# Patient Record
Sex: MALE | Race: WHITE | NOT HISPANIC OR LATINO | Employment: FULL TIME | URBAN - METROPOLITAN AREA
[De-identification: names, ages, dates, MRNs, and addresses within clinical notes are randomized per-mention and may not be internally consistent; named-entity substitution may affect disease eponyms.]

---

## 2023-03-31 LAB — COBALAMIN (VITAMIN B12) (PG/ML) IN SER/PLAS: 589 PG/ML (ref 211–911)

## 2023-08-25 LAB
ALANINE AMINOTRANSFERASE (SGPT) (U/L) IN SER/PLAS: 11 U/L (ref 10–52)
ALBUMIN (G/DL) IN SER/PLAS: 4 G/DL (ref 3.4–5)
ALKALINE PHOSPHATASE (U/L) IN SER/PLAS: 42 U/L (ref 33–136)
ANION GAP IN SER/PLAS: 12 MMOL/L (ref 10–20)
ASPARTATE AMINOTRANSFERASE (SGOT) (U/L) IN SER/PLAS: 14 U/L (ref 9–39)
BASOPHILS (10*3/UL) IN BLOOD BY AUTOMATED COUNT: 0.05 X10E9/L (ref 0–0.1)
BASOPHILS/100 LEUKOCYTES IN BLOOD BY AUTOMATED COUNT: 1 % (ref 0–2)
BILIRUBIN TOTAL (MG/DL) IN SER/PLAS: 1.6 MG/DL (ref 0–1.2)
CALCIUM (MG/DL) IN SER/PLAS: 8.6 MG/DL (ref 8.6–10.3)
CARBON DIOXIDE, TOTAL (MMOL/L) IN SER/PLAS: 26 MMOL/L (ref 21–32)
CHLORIDE (MMOL/L) IN SER/PLAS: 103 MMOL/L (ref 98–107)
CHOLESTEROL (MG/DL) IN SER/PLAS: 159 MG/DL (ref 0–199)
CHOLESTEROL IN HDL (MG/DL) IN SER/PLAS: 52.9 MG/DL
CHOLESTEROL/HDL RATIO: 3
CREATININE (MG/DL) IN SER/PLAS: 0.9 MG/DL (ref 0.5–1.3)
EOSINOPHILS (10*3/UL) IN BLOOD BY AUTOMATED COUNT: 0.15 X10E9/L (ref 0–0.7)
EOSINOPHILS/100 LEUKOCYTES IN BLOOD BY AUTOMATED COUNT: 3.1 % (ref 0–6)
ERYTHROCYTE DISTRIBUTION WIDTH (RATIO) BY AUTOMATED COUNT: 12 % (ref 11.5–14.5)
ERYTHROCYTE MEAN CORPUSCULAR HEMOGLOBIN CONCENTRATION (G/DL) BY AUTOMATED: 33.6 G/DL (ref 32–36)
ERYTHROCYTE MEAN CORPUSCULAR VOLUME (FL) BY AUTOMATED COUNT: 91 FL (ref 80–100)
ERYTHROCYTES (10*6/UL) IN BLOOD BY AUTOMATED COUNT: 4.31 X10E12/L (ref 4.5–5.9)
GFR MALE: >90 ML/MIN/1.73M2
GLUCOSE (MG/DL) IN SER/PLAS: 72 MG/DL (ref 74–99)
HEMATOCRIT (%) IN BLOOD BY AUTOMATED COUNT: 39.3 % (ref 41–52)
HEMOGLOBIN (G/DL) IN BLOOD: 13.2 G/DL (ref 13.5–17.5)
IMMATURE GRANULOCYTES/100 LEUKOCYTES IN BLOOD BY AUTOMATED COUNT: 0.2 % (ref 0–0.9)
LDL: 97 MG/DL (ref 0–99)
LEUKOCYTES (10*3/UL) IN BLOOD BY AUTOMATED COUNT: 4.8 X10E9/L (ref 4.4–11.3)
LYMPHOCYTES (10*3/UL) IN BLOOD BY AUTOMATED COUNT: 1.58 X10E9/L (ref 1.2–4.8)
LYMPHOCYTES/100 LEUKOCYTES IN BLOOD BY AUTOMATED COUNT: 33 % (ref 13–44)
MONOCYTES (10*3/UL) IN BLOOD BY AUTOMATED COUNT: 0.34 X10E9/L (ref 0.1–1)
MONOCYTES/100 LEUKOCYTES IN BLOOD BY AUTOMATED COUNT: 7.1 % (ref 2–10)
NEUTROPHILS (10*3/UL) IN BLOOD BY AUTOMATED COUNT: 2.66 X10E9/L (ref 1.2–7.7)
NEUTROPHILS/100 LEUKOCYTES IN BLOOD BY AUTOMATED COUNT: 55.6 % (ref 40–80)
PLATELETS (10*3/UL) IN BLOOD AUTOMATED COUNT: 172 X10E9/L (ref 150–450)
POTASSIUM (MMOL/L) IN SER/PLAS: 3.7 MMOL/L (ref 3.5–5.3)
PROTEIN TOTAL: 6.4 G/DL (ref 6.4–8.2)
SODIUM (MMOL/L) IN SER/PLAS: 137 MMOL/L (ref 136–145)
THYROTROPIN (MIU/L) IN SER/PLAS BY DETECTION LIMIT <= 0.05 MIU/L: 2.25 MIU/L (ref 0.44–3.98)
TRIGLYCERIDE (MG/DL) IN SER/PLAS: 48 MG/DL (ref 0–149)
UREA NITROGEN (MG/DL) IN SER/PLAS: 13 MG/DL (ref 6–23)
VLDL: 10 MG/DL (ref 0–40)

## 2023-08-26 LAB
ABO GROUP (TYPE) IN BLOOD: NORMAL
PROSTATE SPECIFIC ANTIGEN,SCREEN: 0.99 NG/ML (ref 0–4)
RH FACTOR: NORMAL

## 2023-09-06 ENCOUNTER — TELEPHONE (OUTPATIENT)
Dept: PRIMARY CARE | Facility: CLINIC | Age: 63
End: 2023-09-06
Payer: COMMERCIAL

## 2023-09-08 LAB
BASOPHILS (10*3/UL) IN BLOOD BY AUTOMATED COUNT: 0.04 X10E9/L (ref 0–0.1)
BASOPHILS/100 LEUKOCYTES IN BLOOD BY AUTOMATED COUNT: 0.8 % (ref 0–2)
BILIRUBIN TOTAL (MG/DL) IN SER/PLAS: 0.9 MG/DL (ref 0–1.2)
EOSINOPHILS (10*3/UL) IN BLOOD BY AUTOMATED COUNT: 0.23 X10E9/L (ref 0–0.7)
EOSINOPHILS/100 LEUKOCYTES IN BLOOD BY AUTOMATED COUNT: 4.9 % (ref 0–6)
ERYTHROCYTE DISTRIBUTION WIDTH (RATIO) BY AUTOMATED COUNT: 12.5 % (ref 11.5–14.5)
ERYTHROCYTE MEAN CORPUSCULAR HEMOGLOBIN CONCENTRATION (G/DL) BY AUTOMATED: 33.3 G/DL (ref 32–36)
ERYTHROCYTE MEAN CORPUSCULAR VOLUME (FL) BY AUTOMATED COUNT: 92 FL (ref 80–100)
ERYTHROCYTES (10*6/UL) IN BLOOD BY AUTOMATED COUNT: 4.24 X10E12/L (ref 4.5–5.9)
HEMATOCRIT (%) IN BLOOD BY AUTOMATED COUNT: 39 % (ref 41–52)
HEMOGLOBIN (G/DL) IN BLOOD: 13 G/DL (ref 13.5–17.5)
IMMATURE GRANULOCYTES/100 LEUKOCYTES IN BLOOD BY AUTOMATED COUNT: 0.2 % (ref 0–0.9)
LEUKOCYTES (10*3/UL) IN BLOOD BY AUTOMATED COUNT: 4.7 X10E9/L (ref 4.4–11.3)
LYMPHOCYTES (10*3/UL) IN BLOOD BY AUTOMATED COUNT: 1.72 X10E9/L (ref 1.2–4.8)
LYMPHOCYTES/100 LEUKOCYTES IN BLOOD BY AUTOMATED COUNT: 36.4 % (ref 13–44)
MONOCYTES (10*3/UL) IN BLOOD BY AUTOMATED COUNT: 0.3 X10E9/L (ref 0.1–1)
MONOCYTES/100 LEUKOCYTES IN BLOOD BY AUTOMATED COUNT: 6.3 % (ref 2–10)
NEUTROPHILS (10*3/UL) IN BLOOD BY AUTOMATED COUNT: 2.43 X10E9/L (ref 1.2–7.7)
NEUTROPHILS/100 LEUKOCYTES IN BLOOD BY AUTOMATED COUNT: 51.4 % (ref 40–80)
PLATELETS (10*3/UL) IN BLOOD AUTOMATED COUNT: 181 X10E9/L (ref 150–450)

## 2023-09-12 NOTE — RESULT ENCOUNTER NOTE
Results discussed with the patient.  Bilirubin is normal now.  Has anemia.  He does have some night sweats.  No bleeding.  Up-to-date with colonoscopy.  Discussed stopping all vitamins temporarily to see if night sweats improve.  Follow-up in a couple of weeks and will decide on further work-up regarding anemia.  Continue to eat iron rich foods

## 2023-09-19 ENCOUNTER — OFFICE VISIT (OUTPATIENT)
Dept: PRIMARY CARE | Facility: CLINIC | Age: 63
End: 2023-09-19
Payer: COMMERCIAL

## 2023-09-19 VITALS
WEIGHT: 170.9 LBS | TEMPERATURE: 97.9 F | SYSTOLIC BLOOD PRESSURE: 131 MMHG | OXYGEN SATURATION: 97 % | BODY MASS INDEX: 23.18 KG/M2 | DIASTOLIC BLOOD PRESSURE: 75 MMHG | HEART RATE: 52 BPM

## 2023-09-19 DIAGNOSIS — D64.9 ANEMIA, UNSPECIFIED TYPE: Primary | ICD-10-CM

## 2023-09-19 PROCEDURE — 1036F TOBACCO NON-USER: CPT | Performed by: INTERNAL MEDICINE

## 2023-09-19 PROCEDURE — 99212 OFFICE O/P EST SF 10 MIN: CPT | Performed by: INTERNAL MEDICINE

## 2023-09-19 RX ORDER — MELATONIN 10 MG
10 CAPSULE ORAL NIGHTLY
COMMUNITY
Start: 2023-07-11

## 2023-09-19 RX ORDER — METOPROLOL TARTRATE 25 MG/1
25 TABLET, FILM COATED ORAL 2 TIMES DAILY
COMMUNITY
Start: 2023-03-13 | End: 2023-12-06 | Stop reason: ALTCHOICE

## 2023-09-19 RX ORDER — PERPHENAZINE 16 MG
TABLET ORAL
COMMUNITY
Start: 2022-06-15

## 2023-09-19 RX ORDER — NITROGLYCERIN 0.4 MG/1
TABLET SUBLINGUAL
COMMUNITY
End: 2023-12-06 | Stop reason: SDUPTHER

## 2023-09-19 RX ORDER — GABAPENTIN 100 MG/1
100 CAPSULE ORAL
COMMUNITY
Start: 2022-06-15 | End: 2023-10-27 | Stop reason: ALTCHOICE

## 2023-09-19 RX ORDER — ACETAMINOPHEN 500 MG
50 TABLET ORAL
COMMUNITY

## 2023-09-19 ASSESSMENT — PATIENT HEALTH QUESTIONNAIRE - PHQ9
2. FEELING DOWN, DEPRESSED OR HOPELESS: NOT AT ALL
SUM OF ALL RESPONSES TO PHQ9 QUESTIONS 1 AND 2: 0
1. LITTLE INTEREST OR PLEASURE IN DOING THINGS: NOT AT ALL

## 2023-09-19 NOTE — PROGRESS NOTES
Subjective   Patient ID: Abhijit Hickman is a 62 y.o. male who presents for Follow-up.    HPI   Patient is here to discuss recent labs.  Labs showed anemia.  He has not had any obvious blood loss.  He had a colonoscopy couple of years ago.  He does have hemorrhoids.  Recently did not notice any bleeding  Eating healthy  No abdominal pain or bowel changes.  Seeing surgeon and scheduled for his hernia surgery next month  Has not lost further weight  No cough or chest pain    Review of Systems  No bruising  No night sweats  No headache or dizziness  No chest pain shortness of breath or palpitation  Objective   /75   Pulse 52   Temp 36.6 °C (97.9 °F)   Wt 77.5 kg (170 lb 14.4 oz)   SpO2 97%   BMI 23.18 kg/m²     Physical Exam  Constitutional:       Appearance: Normal appearance.   HENT:      Head: Normocephalic.   Eyes:      Conjunctiva/sclera: Conjunctivae normal.   Cardiovascular:      Rate and Rhythm: Normal rate and regular rhythm.   Pulmonary:      Effort: Pulmonary effort is normal.      Breath sounds: Normal breath sounds.   Abdominal:      General: Abdomen is flat. Bowel sounds are normal. There is no distension.      Palpations: Abdomen is soft. There is no mass.      Tenderness: There is no abdominal tenderness. There is no guarding or rebound.   Neurological:      Mental Status: He is alert.         Assessment/Plan   Problem List Items Addressed This Visit       Anemia - Primary     Anemia is not significant.  Could be from hemorrhoid and intermittent bleeding.  He will be getting labs prior to surgery in 2 weeks.  We will also check ferritin and iron level.  Increase iron rich foods.  We will do further work-up if anemia worsens         Relevant Orders    Ferritin    Iron and TIBC

## 2023-09-20 PROBLEM — K40.90 INGUINAL HERNIA: Status: ACTIVE | Noted: 2023-09-20

## 2023-09-20 PROBLEM — R20.0 NUMBNESS IN RIGHT LEG: Status: ACTIVE | Noted: 2023-09-20

## 2023-09-20 PROBLEM — R79.89 ABNORMAL CBC: Status: ACTIVE | Noted: 2023-09-20

## 2023-09-20 PROBLEM — M79.2 NEUROPATHIC PAIN: Status: ACTIVE | Noted: 2023-09-20

## 2023-09-20 PROBLEM — M20.41 HAMMER TOE OF RIGHT FOOT: Status: ACTIVE | Noted: 2023-09-20

## 2023-09-20 PROBLEM — D48.5 NEOPLASM OF UNCERTAIN BEHAVIOR OF SKIN: Status: ACTIVE | Noted: 2021-07-06

## 2023-09-20 PROBLEM — K92.2 GASTROINTESTINAL HEMORRHAGE: Status: ACTIVE | Noted: 2023-09-20

## 2023-09-20 PROBLEM — M25.512 LEFT SHOULDER PAIN: Status: ACTIVE | Noted: 2023-09-20

## 2023-09-20 PROBLEM — M54.2 NECK PAIN ON LEFT SIDE: Status: ACTIVE | Noted: 2023-09-20

## 2023-09-20 PROBLEM — M54.12 CERVICAL RADICULOPATHY: Status: ACTIVE | Noted: 2023-09-20

## 2023-09-20 PROBLEM — C85.10 B-CELL LYMPHOMA (MULTI): Status: ACTIVE | Noted: 2023-09-20

## 2023-09-20 PROBLEM — H91.90 HEARING LOSS: Status: ACTIVE | Noted: 2023-09-20

## 2023-09-20 PROBLEM — R20.0 NUMBNESS AND TINGLING IN LEFT HAND: Status: ACTIVE | Noted: 2023-09-20

## 2023-09-20 PROBLEM — F32.2 MAJOR DEPRESSIVE DISORDER, SINGLE EPISODE, SEVERE WITHOUT PSYCHOTIC FEATURES (MULTI): Status: ACTIVE | Noted: 2023-09-20

## 2023-09-20 PROBLEM — F41.1 GENERALIZED ANXIETY DISORDER: Status: ACTIVE | Noted: 2023-09-20

## 2023-09-20 PROBLEM — C85.90 LYMPHOMA (MULTI): Status: ACTIVE | Noted: 2023-09-20

## 2023-09-20 PROBLEM — I73.00 RAYNAUD PHENOMENON: Status: ACTIVE | Noted: 2023-09-20

## 2023-09-20 PROBLEM — R20.2 NUMBNESS AND TINGLING IN LEFT HAND: Status: ACTIVE | Noted: 2023-09-20

## 2023-09-20 PROBLEM — I48.0 PAROXYSMAL A-FIB (MULTI): Status: ACTIVE | Noted: 2023-09-20

## 2023-09-20 PROBLEM — G54.0 THORACIC OUTLET SYNDROME: Status: ACTIVE | Noted: 2023-09-20

## 2023-09-20 PROBLEM — G56.12 LEFT MEDIAN NERVE NEUROPATHY: Status: ACTIVE | Noted: 2023-09-20

## 2023-09-20 RX ORDER — GABAPENTIN 400 MG/1
300 CAPSULE ORAL 3 TIMES DAILY
COMMUNITY
End: 2023-12-06 | Stop reason: SDUPTHER

## 2023-09-20 RX ORDER — ACETAMINOPHEN 160 MG/5ML
200 SUSPENSION, ORAL (FINAL DOSE FORM) ORAL 3 TIMES DAILY
COMMUNITY

## 2023-09-20 RX ORDER — TRAZODONE HYDROCHLORIDE 50 MG/1
50 TABLET ORAL NIGHTLY
COMMUNITY
End: 2023-10-27 | Stop reason: ALTCHOICE

## 2023-09-20 RX ORDER — AMOXICILLIN AND CLAVULANATE POTASSIUM 500; 125 MG/1; MG/1
500 TABLET, FILM COATED ORAL 2 TIMES DAILY
COMMUNITY
End: 2023-10-27 | Stop reason: ALTCHOICE

## 2023-09-20 RX ORDER — TRAZODONE HYDROCHLORIDE 100 MG/1
100 TABLET ORAL NIGHTLY
COMMUNITY
End: 2023-12-06 | Stop reason: ALTCHOICE

## 2023-09-20 NOTE — ASSESSMENT & PLAN NOTE
Anemia is not significant.  Could be from hemorrhoid and intermittent bleeding.  He will be getting labs prior to surgery in 2 weeks.  We will also check ferritin and iron level.  Increase iron rich foods.  We will do further work-up if anemia worsens

## 2023-09-27 LAB
ANION GAP IN SER/PLAS: 10 MMOL/L (ref 10–20)
BASOPHILS (10*3/UL) IN BLOOD BY AUTOMATED COUNT: 0.04 X10E9/L (ref 0–0.1)
BASOPHILS/100 LEUKOCYTES IN BLOOD BY AUTOMATED COUNT: 0.7 % (ref 0–2)
CALCIUM (MG/DL) IN SER/PLAS: 9.2 MG/DL (ref 8.6–10.3)
CARBON DIOXIDE, TOTAL (MMOL/L) IN SER/PLAS: 29 MMOL/L (ref 21–32)
CHLORIDE (MMOL/L) IN SER/PLAS: 104 MMOL/L (ref 98–107)
CREATININE (MG/DL) IN SER/PLAS: 0.9 MG/DL (ref 0.5–1.3)
EOSINOPHILS (10*3/UL) IN BLOOD BY AUTOMATED COUNT: 0.25 X10E9/L (ref 0–0.7)
EOSINOPHILS/100 LEUKOCYTES IN BLOOD BY AUTOMATED COUNT: 4.6 % (ref 0–6)
ERYTHROCYTE DISTRIBUTION WIDTH (RATIO) BY AUTOMATED COUNT: 12.6 % (ref 11.5–14.5)
ERYTHROCYTE MEAN CORPUSCULAR HEMOGLOBIN CONCENTRATION (G/DL) BY AUTOMATED: 33.6 G/DL (ref 32–36)
ERYTHROCYTE MEAN CORPUSCULAR VOLUME (FL) BY AUTOMATED COUNT: 90 FL (ref 80–100)
ERYTHROCYTES (10*6/UL) IN BLOOD BY AUTOMATED COUNT: 4.49 X10E12/L (ref 4.5–5.9)
GFR MALE: >90 ML/MIN/1.73M2
GLUCOSE (MG/DL) IN SER/PLAS: 84 MG/DL (ref 74–99)
HEMATOCRIT (%) IN BLOOD BY AUTOMATED COUNT: 40.5 % (ref 41–52)
HEMOGLOBIN (G/DL) IN BLOOD: 13.6 G/DL (ref 13.5–17.5)
IMMATURE GRANULOCYTES/100 LEUKOCYTES IN BLOOD BY AUTOMATED COUNT: 0.4 % (ref 0–0.9)
LEUKOCYTES (10*3/UL) IN BLOOD BY AUTOMATED COUNT: 5.5 X10E9/L (ref 4.4–11.3)
LYMPHOCYTES (10*3/UL) IN BLOOD BY AUTOMATED COUNT: 1.78 X10E9/L (ref 1.2–4.8)
LYMPHOCYTES/100 LEUKOCYTES IN BLOOD BY AUTOMATED COUNT: 32.5 % (ref 13–44)
MONOCYTES (10*3/UL) IN BLOOD BY AUTOMATED COUNT: 0.37 X10E9/L (ref 0.1–1)
MONOCYTES/100 LEUKOCYTES IN BLOOD BY AUTOMATED COUNT: 6.8 % (ref 2–10)
NEUTROPHILS (10*3/UL) IN BLOOD BY AUTOMATED COUNT: 3.02 X10E9/L (ref 1.2–7.7)
NEUTROPHILS/100 LEUKOCYTES IN BLOOD BY AUTOMATED COUNT: 55 % (ref 40–80)
PLATELETS (10*3/UL) IN BLOOD AUTOMATED COUNT: 188 X10E9/L (ref 150–450)
POTASSIUM (MMOL/L) IN SER/PLAS: 4.1 MMOL/L (ref 3.5–5.3)
SODIUM (MMOL/L) IN SER/PLAS: 139 MMOL/L (ref 136–145)
UREA NITROGEN (MG/DL) IN SER/PLAS: 12 MG/DL (ref 6–23)

## 2023-09-29 LAB — STAPH/MRSA SCREEN, CULTURE: NORMAL

## 2023-09-30 ENCOUNTER — LAB (OUTPATIENT)
Dept: LAB | Facility: LAB | Age: 63
End: 2023-09-30
Payer: COMMERCIAL

## 2023-09-30 DIAGNOSIS — D64.9 ANEMIA, UNSPECIFIED TYPE: ICD-10-CM

## 2023-09-30 LAB
FERRITIN SERPL-MCNC: 194 NG/ML (ref 20–300)
IRON SATN MFR SERPL: 33 % (ref 25–45)
IRON SERPL-MCNC: 102 UG/DL (ref 35–150)
TIBC SERPL-MCNC: 313 UG/DL (ref 240–445)
UIBC SERPL-MCNC: 211 UG/DL (ref 110–370)

## 2023-09-30 PROCEDURE — 36415 COLL VENOUS BLD VENIPUNCTURE: CPT

## 2023-09-30 NOTE — H&P (VIEW-ONLY)
History of Present Illness:   Admission Reason: bilateral inguinal hernia without  obstruction or gangrene   HPI:    Date of Consult: 9/27/23  Referring Provider:  Dr. Webb   Surgery, Date and Length: robotic vs laparoscopic bilateral inguinal hernia repair; 10/13/23; 90 minutes   Abhijit Hickman is a 62 year-old male who presents [facility] for preoperative risk assessment prior to surgery.  He noticed increased size of a bulge in his right and left groin respectively. Bothers him when he does his physical exercise routine. He  does ride long distances on his bike.  This note was created in part upon personal review of patient?s medical records.   Patient is scheduled to have robotic vs laparoscopic bilateral inguinal hernia repair.  Medical History  Paroxysmal A. fib - dx 3/2023 s/p cardioversion - metoprolol  dc?d and finished xarelto - follows with Dr. Gay LV 4/10/23 - f/u in 6-12 months   b-cell lymphoma - s/p radiation 2020 - remission  anemia   Raynaud?s  Thoracic outlet syndrome   depression       STOP BANG  2                                                                                                                                                     CAPRINI  6  Surgical History  Cardioversion 3/31/23  Appendectomy  Oral surgery       Pt denies any past history of anesthetic complications such as PONV, awareness, prolonged sedation, dental damage, aspiration, cardiac arrest, difficult intubation, difficult I.V. access or  unexpected hospital admissions.  No malignant hyperthermia or pseudocholinesterase deficiency.  No history blood transfusions  Pt is not a Jehovah Witness and will accept blood and blood products if medically indicated.  Type and screen not sent.    Body Measurements:  Height:  183.5 cm   Weight:   9/27 15:27: Weight in kg (Weight (kg))  77.2  9/27 15:27: Weight in lbs ((lbs))  170.1  9/27 15:27: BMI (kg/m2) (BMI (kg/m2))  22.926        Family History:   Cancer: yes  father -  multiple myeloma   Diabetes: yes  mother and father     Social History:   Social History   Smoking Status never smoker   Alcohol Use denies   Drug Use denies   Social History                  Allergies:  ·  No Known Allergies :     Medications Prior to Admission:   7 DAYS BEFORE SURGERY, ON __10/6______, STOP these medications:  Co-Q10 200 mg oral capsule: 1 cap(s) orally once a day  Alpha Lipoic Acid 600 mg oral capsule: 1 cap(s) orally once a day  collagen peptides: 1  orally once a day  L- Carnitine tartrate: orally once a day  Creatine Monohydrate: orally once a day      HOLD MORNING OF SURGERY :  cholecalciferol 25 mcg (1000 intl units) oral capsule: 1 cap(s) orally once a day (Vitamin D3)  Vitamin B Complex oral tablet: 1 tab(s) orally once a day  magnesium malate 125 mg oral tablet, chewable: 2 tab(s) orally once a day  potassium chloride 10 mEq oral capsule, extended release: 1 cap(s) orally 2 times a day      DAY OF SURGERY, TAKE THESE MEDICATIONS:   gabapentin 100 mg oral capsule: 4 cap(s) orally 3 times a day  nitroglycerin 0.4 mg sublingual tablet: 1 tab(s) sublingually every 5 minutes, As Needed - for chest pain  traZODone 100 mg oral tablet: 1  orally once a day (at bedtime).    Review of Systems:   Eyes: POSITIVE: Vision Loss/ Change;  COMMENTS: glasses     Cardiac: COMMENTS: METS 4  rides bicycle     All Other Systems: All other systems reviewed and  are negative     Objective:     Objective Information:        T   P  R  BP   MAP  SpO2   Value  35.9  54  18  146/85      100%  Date/Time 9/27 15:27 9/27 15:27 9/27 15:27 9/27 15:27    9/27 15:27  Range  (35.9C - 35.9C )  (54 - 54 )  (18 - 18 )  (146 - 146 )/ (85 - 85 )    (100% - 100% )         Weights   9/27 15:27: Weight in kg (Weight (kg))  77.2  9/27 15:27: Weight in lbs ((lbs))  170.1  9/27 15:27: BMI (kg/m2) (BMI (kg/m2))  22.926    Physical Exam by System     Constitutional: Alert orientated x 3 no acute distress  pleasant and  cooperative   Eyes: PERRL   ENMT: pharynx clear no erythema or exudate noted   Head/Neck: normocephalic  neck supple nontender trachea midline  no palpable lymphadenopathy noted   no carotid bruits noted   Respiratory/Thorax: CTA  without wheezes rales rhonchi  heard  respiratory rate regular and unlabored   Cardiovascular: RR without murmur heard at this time   Gastrointestinal: soft nontender no definitive masses  noted   Genitourinary: defer   Musculoskeletal: no gross deformities  moves all extremities without difficulty   Extremities: no pedal edema noted  DP pulses palpable   Neurological: cranial nerves grossly intact   Breast: defer   Lymphatic: No significant lymphadenopathy   Psychological: Appropriate mood and behavior   Skin: warm and dry, good color, good turgor and texture     Airway   ·  Mouth Opening OK yes   ·  Neck Flexibility OK yes   ·  Loose Teeth no   ·  Snoring Hx/Sleep Apnea no     Airway Classification   ·  Airway Image Comments no dentures / partials     Recent Lab Results     Results:        I have reviewed these laboratory results:    Basic Metabolic Panel  27-Sep-2023 16:23:00      Result Value    Glucose, Serum  84    NA  139    K  4.1    CL  104    Bicarbonate, Serum  29    Anion Gap, Serum  10    BUN  12    CREAT  0.90    GFR Male  >90    Calcium, Serum  9.2      Complete Blood Count + Differential  27-Sep-2023 16:23:00      Result Value    White Blood Cell Count  5.5    Red Blood Cell Count  4.49   L   HGB  13.6    HCT  40.5   L   MCV  90    MCHC  33.6    PLT  188    RDW-CV  12.6    Neutrophil %  55.0    Immature Granulocytes %  0.4    Lymphocyte %  32.5    Monocyte %  6.8    Eosinophil %  4.6    Basophil %  0.7    Neutrophil Count  3.02    Lymphocyte Count  1.78    Monocyte Count  0.37    Eosinophil Count  0.25    Basophil Count  0.04        Radiology Results     Results:    EKG 9/27/23  unusual P axis, possible ectopic atrial bradycardia  IRBBB  inferior infarct, age  undetermined  cannot r/o anteroseptal infarct, age undetermined  54 BPM    HAYLEE 3/13/23  CONCLUSIONS:   1. Left ventricular systolic function is normal with a 55% estimated ejection fraction.   2. No left atrial thrombus.   3. There is no evidence of a patent foramen ovale.       Assessment and Plan:   Assessment:    Patient is a 62 yr-old male scheduled for robotic vs laparoscopic bilateral inguinal hernia repair.  Patient has no active cardiac symptoms. Patient denies any chest pain, tightness, heaviness, pressure, radiating pain, palpitations,  irregular heartbeats, lightheadedness, cough, congestion, shortness of breath, BRADY, PND, near syncope, weight loss or gain.                                                                                                                                                                                  RCRI 1,   6% Risk of MACE  Hematology       Patient instructed to ambulate as soon as possible postoperatively to decrease thromboembolic risk.       Initiate mechanical DVT prophylaxis as soon as possible and initiate chemical prophylaxis when deemed safe from a bleeding standpoint post surgery.       Caprini  6       VTE prophylaxis per surgical team     Tests ordered in PAT: cbc, bmp, mrsa, ekg   LABS REVIEWED from 9/29/23 - unremarkable   Follow up: MRSA pending; see addendum   Risk assessment complete.  Patient is scheduled for a low/intermediate surgical risk procedure.   Preoperative medication instructions were provided and reviewed with the patient.  Any additional testing or evaluation was explained to the patient.  Nothing by mouth instructions were discussed  and patient?s questions were answered prior to conclusion of this encounter.  Patient verbalized understanding of preoperative instructions given in preadmission testing.  Discharge instructions available in EMR.        Electronic Signatures for Addendum Section:   Marlene Sandoval (LPN) (Signed Addendum  29-Sep-2023 12:24)   PATIENT: ARTHUR BAEZ                MRN: [id number]    LOCATION: 41550     BILL#:   [id number]                        : [birth year]  AGE:    SEX: M     ORDER#:  [id number]                        ORDERED BY:   ROSCOE CASTRO  SOURCE:  ANTERIOR NARES                    COLLECTED:  23 16:23  ANTIBIOTICS AT GARETH.:                      RECEIVED :  23 00:22  SITE:                                         R E S U L T S     STAPH/MRSA SCREEN                            FINAL     23 09:55        NO Staphylococcus aureus ISOLATED.     Electronic Signatures:  Fátima Owens (PAC)  (Signed 29-Sep-2023 07:30)   Authored: History of Present Illness, Family History,  Social History, Allergies, Medications Prior to Admission, Review of Systems, Objective, Assessment and Plan, Note Completion      Last Updated: 29-Sep-2023 12:24 by Marlene Sandoval (LPN)

## 2023-10-02 ENCOUNTER — TELEPHONE (OUTPATIENT)
Dept: PRIMARY CARE | Facility: CLINIC | Age: 63
End: 2023-10-02
Payer: COMMERCIAL

## 2023-10-12 ENCOUNTER — ANESTHESIA EVENT (OUTPATIENT)
Dept: OPERATING ROOM | Facility: HOSPITAL | Age: 63
End: 2023-10-12
Payer: COMMERCIAL

## 2023-10-13 ENCOUNTER — HOSPITAL ENCOUNTER (OUTPATIENT)
Facility: HOSPITAL | Age: 63
Setting detail: OUTPATIENT SURGERY
Discharge: HOME | End: 2023-10-13
Attending: SURGERY | Admitting: SURGERY
Payer: COMMERCIAL

## 2023-10-13 ENCOUNTER — ANESTHESIA (OUTPATIENT)
Dept: OPERATING ROOM | Facility: HOSPITAL | Age: 63
End: 2023-10-13
Payer: COMMERCIAL

## 2023-10-13 VITALS
BODY MASS INDEX: 23.44 KG/M2 | WEIGHT: 173.06 LBS | TEMPERATURE: 97.2 F | SYSTOLIC BLOOD PRESSURE: 148 MMHG | DIASTOLIC BLOOD PRESSURE: 79 MMHG | HEIGHT: 72 IN | OXYGEN SATURATION: 99 % | HEART RATE: 62 BPM | RESPIRATION RATE: 15 BRPM

## 2023-10-13 DIAGNOSIS — K40.20 NON-RECURRENT BILATERAL INGUINAL HERNIA WITHOUT OBSTRUCTION OR GANGRENE: Primary | ICD-10-CM

## 2023-10-13 PROCEDURE — A4217 STERILE WATER/SALINE, 500 ML: HCPCS | Performed by: SURGERY

## 2023-10-13 PROCEDURE — 3700000001 HC GENERAL ANESTHESIA TIME - INITIAL BASE CHARGE: Performed by: SURGERY

## 2023-10-13 PROCEDURE — 3600000009 HC OR TIME - EACH INCREMENTAL 1 MINUTE - PROCEDURE LEVEL FOUR: Performed by: SURGERY

## 2023-10-13 PROCEDURE — 7100000010 HC PHASE TWO TIME - EACH INCREMENTAL 1 MINUTE: Performed by: SURGERY

## 2023-10-13 PROCEDURE — A49650 PR LAP,INGUINAL HERNIA REPR,INITIAL: Performed by: NURSE ANESTHETIST, CERTIFIED REGISTERED

## 2023-10-13 PROCEDURE — C1781 MESH (IMPLANTABLE): HCPCS | Performed by: SURGERY

## 2023-10-13 PROCEDURE — 3600000004 HC OR TIME - INITIAL BASE CHARGE - PROCEDURE LEVEL FOUR: Performed by: SURGERY

## 2023-10-13 PROCEDURE — A49650 PR LAP,INGUINAL HERNIA REPR,INITIAL: Performed by: ANESTHESIOLOGY

## 2023-10-13 PROCEDURE — 2500000005 HC RX 250 GENERAL PHARMACY W/O HCPCS: Performed by: SURGERY

## 2023-10-13 PROCEDURE — 2500000005 HC RX 250 GENERAL PHARMACY W/O HCPCS: Performed by: NURSE ANESTHETIST, CERTIFIED REGISTERED

## 2023-10-13 PROCEDURE — 3700000002 HC GENERAL ANESTHESIA TIME - EACH INCREMENTAL 1 MINUTE: Performed by: SURGERY

## 2023-10-13 PROCEDURE — 2580000001 HC RX 258 IV SOLUTIONS: Performed by: ANESTHESIOLOGY

## 2023-10-13 PROCEDURE — 49650 LAP ING HERNIA REPAIR INIT: CPT | Performed by: SURGERY

## 2023-10-13 PROCEDURE — 7100000001 HC RECOVERY ROOM TIME - INITIAL BASE CHARGE: Performed by: SURGERY

## 2023-10-13 PROCEDURE — 2580000001 HC RX 258 IV SOLUTIONS: Performed by: NURSE ANESTHETIST, CERTIFIED REGISTERED

## 2023-10-13 PROCEDURE — 7100000002 HC RECOVERY ROOM TIME - EACH INCREMENTAL 1 MINUTE: Performed by: SURGERY

## 2023-10-13 PROCEDURE — 2500000004 HC RX 250 GENERAL PHARMACY W/ HCPCS (ALT 636 FOR OP/ED): Performed by: NURSE ANESTHETIST, CERTIFIED REGISTERED

## 2023-10-13 PROCEDURE — 2780000003 HC OR 278 NO HCPCS: Performed by: SURGERY

## 2023-10-13 PROCEDURE — 7100000009 HC PHASE TWO TIME - INITIAL BASE CHARGE: Performed by: SURGERY

## 2023-10-13 PROCEDURE — 2500000004 HC RX 250 GENERAL PHARMACY W/ HCPCS (ALT 636 FOR OP/ED): Performed by: SURGERY

## 2023-10-13 PROCEDURE — 2720000007 HC OR 272 NO HCPCS: Performed by: SURGERY

## 2023-10-13 DEVICE — MESH, 3DMAX MID, 5 X 7 IN, X-LARGE RIGHT: Type: IMPLANTABLE DEVICE | Site: GROIN | Status: FUNCTIONAL

## 2023-10-13 DEVICE — MESH, 3DMAX MID, 5 X 7 IN, X-LARGE LEFT: Type: IMPLANTABLE DEVICE | Site: GROIN | Status: FUNCTIONAL

## 2023-10-13 RX ORDER — DEXAMETHASONE SODIUM PHOSPHATE 4 MG/ML
INJECTION, SOLUTION INTRA-ARTICULAR; INTRALESIONAL; INTRAMUSCULAR; INTRAVENOUS; SOFT TISSUE AS NEEDED
Status: DISCONTINUED | OUTPATIENT
Start: 2023-10-13 | End: 2023-10-13

## 2023-10-13 RX ORDER — HYDROMORPHONE HYDROCHLORIDE 1 MG/ML
INJECTION, SOLUTION INTRAMUSCULAR; INTRAVENOUS; SUBCUTANEOUS AS NEEDED
Status: DISCONTINUED | OUTPATIENT
Start: 2023-10-13 | End: 2023-10-13

## 2023-10-13 RX ORDER — CEFAZOLIN SODIUM 2 G/100ML
INJECTION, SOLUTION INTRAVENOUS AS NEEDED
Status: DISCONTINUED | OUTPATIENT
Start: 2023-10-13 | End: 2023-10-13

## 2023-10-13 RX ORDER — LIDOCAINE HYDROCHLORIDE 20 MG/ML
INJECTION, SOLUTION INFILTRATION; PERINEURAL AS NEEDED
Status: DISCONTINUED | OUTPATIENT
Start: 2023-10-13 | End: 2023-10-13

## 2023-10-13 RX ORDER — SODIUM CHLORIDE, SODIUM LACTATE, POTASSIUM CHLORIDE, CALCIUM CHLORIDE 600; 310; 30; 20 MG/100ML; MG/100ML; MG/100ML; MG/100ML
INJECTION, SOLUTION INTRAVENOUS CONTINUOUS PRN
Status: DISCONTINUED | OUTPATIENT
Start: 2023-10-13 | End: 2023-10-13

## 2023-10-13 RX ORDER — SODIUM CHLORIDE 0.9 G/100ML
IRRIGANT IRRIGATION AS NEEDED
Status: DISCONTINUED | OUTPATIENT
Start: 2023-10-13 | End: 2023-10-13 | Stop reason: HOSPADM

## 2023-10-13 RX ORDER — BUPIVACAINE HYDROCHLORIDE 5 MG/ML
INJECTION, SOLUTION PERINEURAL AS NEEDED
Status: DISCONTINUED | OUTPATIENT
Start: 2023-10-13 | End: 2023-10-13 | Stop reason: HOSPADM

## 2023-10-13 RX ORDER — SODIUM CHLORIDE, SODIUM LACTATE, POTASSIUM CHLORIDE, CALCIUM CHLORIDE 600; 310; 30; 20 MG/100ML; MG/100ML; MG/100ML; MG/100ML
100 INJECTION, SOLUTION INTRAVENOUS CONTINUOUS
Status: DISCONTINUED | OUTPATIENT
Start: 2023-10-13 | End: 2023-10-13 | Stop reason: HOSPADM

## 2023-10-13 RX ORDER — FENTANYL CITRATE 50 UG/ML
INJECTION, SOLUTION INTRAMUSCULAR; INTRAVENOUS AS NEEDED
Status: DISCONTINUED | OUTPATIENT
Start: 2023-10-13 | End: 2023-10-13

## 2023-10-13 RX ORDER — PROPOFOL 10 MG/ML
INJECTION, EMULSION INTRAVENOUS AS NEEDED
Status: DISCONTINUED | OUTPATIENT
Start: 2023-10-13 | End: 2023-10-13

## 2023-10-13 RX ORDER — IBUPROFEN 600 MG/1
600 TABLET ORAL EVERY 6 HOURS PRN
Qty: 20 TABLET | Refills: 0 | Status: SHIPPED | OUTPATIENT
Start: 2023-10-13

## 2023-10-13 RX ORDER — MEPERIDINE HYDROCHLORIDE 25 MG/ML
12.5 INJECTION INTRAMUSCULAR; INTRAVENOUS; SUBCUTANEOUS EVERY 10 MIN PRN
Status: DISCONTINUED | OUTPATIENT
Start: 2023-10-13 | End: 2023-10-13 | Stop reason: HOSPADM

## 2023-10-13 RX ORDER — ONDANSETRON HYDROCHLORIDE 2 MG/ML
4 INJECTION, SOLUTION INTRAVENOUS ONCE AS NEEDED
Status: DISCONTINUED | OUTPATIENT
Start: 2023-10-13 | End: 2023-10-13 | Stop reason: HOSPADM

## 2023-10-13 RX ORDER — ROCURONIUM BROMIDE 10 MG/ML
INJECTION, SOLUTION INTRAVENOUS AS NEEDED
Status: DISCONTINUED | OUTPATIENT
Start: 2023-10-13 | End: 2023-10-13

## 2023-10-13 RX ORDER — OXYCODONE HYDROCHLORIDE 5 MG/1
5 TABLET ORAL EVERY 4 HOURS PRN
Status: DISCONTINUED | OUTPATIENT
Start: 2023-10-13 | End: 2023-10-13 | Stop reason: HOSPADM

## 2023-10-13 RX ORDER — ONDANSETRON HYDROCHLORIDE 2 MG/ML
INJECTION, SOLUTION INTRAVENOUS AS NEEDED
Status: DISCONTINUED | OUTPATIENT
Start: 2023-10-13 | End: 2023-10-13

## 2023-10-13 RX ORDER — OXYCODONE HYDROCHLORIDE 5 MG/1
5 TABLET ORAL EVERY 6 HOURS PRN
Qty: 12 TABLET | Refills: 0 | Status: SHIPPED | OUTPATIENT
Start: 2023-10-13 | End: 2023-10-27 | Stop reason: ALTCHOICE

## 2023-10-13 RX ORDER — POLYETHYLENE GLYCOL 3350 17 G/17G
17 POWDER, FOR SOLUTION ORAL DAILY PRN
Qty: 10 PACKET | Refills: 0 | Status: SHIPPED | OUTPATIENT
Start: 2023-10-13 | End: 2023-12-06 | Stop reason: ALTCHOICE

## 2023-10-13 RX ADMIN — SUGAMMADEX 160 MG: 100 INJECTION, SOLUTION INTRAVENOUS at 11:51

## 2023-10-13 RX ADMIN — DEXAMETHASONE SODIUM PHOSPHATE 8 MG: 4 INJECTION, SOLUTION INTRAMUSCULAR; INTRAVENOUS at 10:18

## 2023-10-13 RX ADMIN — FENTANYL CITRATE 100 MCG: 50 INJECTION, SOLUTION INTRAMUSCULAR; INTRAVENOUS at 10:02

## 2023-10-13 RX ADMIN — EPHEDRINE SULFATE 10 MG: 50 INJECTION, SOLUTION INTRAVENOUS at 11:54

## 2023-10-13 RX ADMIN — EPHEDRINE SULFATE 10 MG: 50 INJECTION, SOLUTION INTRAVENOUS at 11:08

## 2023-10-13 RX ADMIN — SODIUM CHLORIDE, POTASSIUM CHLORIDE, SODIUM LACTATE AND CALCIUM CHLORIDE: 600; 310; 30; 20 INJECTION, SOLUTION INTRAVENOUS at 10:02

## 2023-10-13 RX ADMIN — EPHEDRINE SULFATE 10 MG: 50 INJECTION, SOLUTION INTRAVENOUS at 10:26

## 2023-10-13 RX ADMIN — PROPOFOL 160 MG: 10 INJECTION, EMULSION INTRAVENOUS at 10:12

## 2023-10-13 RX ADMIN — SODIUM CHLORIDE, POTASSIUM CHLORIDE, SODIUM LACTATE AND CALCIUM CHLORIDE: 600; 310; 30; 20 INJECTION, SOLUTION INTRAVENOUS at 11:25

## 2023-10-13 RX ADMIN — FENTANYL CITRATE 50 MCG: 50 INJECTION, SOLUTION INTRAMUSCULAR; INTRAVENOUS at 11:23

## 2023-10-13 RX ADMIN — LIDOCAINE HYDROCHLORIDE 80 MG: 20 INJECTION, SOLUTION INFILTRATION; PERINEURAL at 10:12

## 2023-10-13 RX ADMIN — FENTANYL CITRATE 50 MCG: 50 INJECTION, SOLUTION INTRAMUSCULAR; INTRAVENOUS at 11:54

## 2023-10-13 RX ADMIN — HYDROMORPHONE HYDROCHLORIDE 0.5 MG: 1 INJECTION, SOLUTION INTRAMUSCULAR; INTRAVENOUS; SUBCUTANEOUS at 12:01

## 2023-10-13 RX ADMIN — GLYCOPYRROLATE 0.2 MG: 0.2 INJECTION, SOLUTION INTRAMUSCULAR; INTRAVITREAL at 10:22

## 2023-10-13 RX ADMIN — CEFAZOLIN SODIUM 2 G: 2 INJECTION, SOLUTION INTRAVENOUS at 10:17

## 2023-10-13 RX ADMIN — ONDANSETRON 4 MG: 2 INJECTION INTRAMUSCULAR; INTRAVENOUS at 10:02

## 2023-10-13 RX ADMIN — ROCURONIUM BROMIDE 50 MG: 10 INJECTION INTRAVENOUS at 10:12

## 2023-10-13 RX ADMIN — SODIUM CHLORIDE, POTASSIUM CHLORIDE, SODIUM LACTATE AND CALCIUM CHLORIDE 100 ML/HR: 600; 310; 30; 20 INJECTION, SOLUTION INTRAVENOUS at 13:09

## 2023-10-13 RX ADMIN — PROPOFOL 40 MG: 10 INJECTION, EMULSION INTRAVENOUS at 10:32

## 2023-10-13 RX ADMIN — ROCURONIUM BROMIDE 20 MG: 10 INJECTION INTRAVENOUS at 11:04

## 2023-10-13 RX ADMIN — HYDROMORPHONE HYDROCHLORIDE 0.5 MG: 1 INJECTION, SOLUTION INTRAMUSCULAR; INTRAVENOUS; SUBCUTANEOUS at 11:56

## 2023-10-13 ASSESSMENT — COLUMBIA-SUICIDE SEVERITY RATING SCALE - C-SSRS
2. HAVE YOU ACTUALLY HAD ANY THOUGHTS OF KILLING YOURSELF?: NO
1. IN THE PAST MONTH, HAVE YOU WISHED YOU WERE DEAD OR WISHED YOU COULD GO TO SLEEP AND NOT WAKE UP?: NO
6. HAVE YOU EVER DONE ANYTHING, STARTED TO DO ANYTHING, OR PREPARED TO DO ANYTHING TO END YOUR LIFE?: NO

## 2023-10-13 ASSESSMENT — PAIN SCALES - GENERAL
PAINLEVEL_OUTOF10: 0 - NO PAIN
PAINLEVEL_OUTOF10: 0 - NO PAIN
PAIN_LEVEL: 3
PAINLEVEL_OUTOF10: 0 - NO PAIN
PAINLEVEL_OUTOF10: 0 - NO PAIN
PAINLEVEL_OUTOF10: 2
PAINLEVEL_OUTOF10: 0 - NO PAIN

## 2023-10-13 ASSESSMENT — PAIN - FUNCTIONAL ASSESSMENT: PAIN_FUNCTIONAL_ASSESSMENT: 0-10

## 2023-10-13 NOTE — OP NOTE
Robotic Bilateral Inguinal Hernia Repair with Mesh Placement (B) Operative Note     Date: 10/13/2023  OR Location: [facility] OR    Name: Abhijit Hickman, : 1960, Age: 62 y.o., MRN: [id number], Sex: male    Diagnosis  Pre-op Diagnosis     * Bilateral inguinal hernia, without obstruction or gangrene, not specified as recurrent [K40.20] Post-op Diagnosis     * Bilateral inguinal hernia, without obstruction or gangrene, not specified as recurrent [K40.20]     Procedures    * Robotic Bilateral Inguinal Hernia Repair with Mesh Placement    Surgeons      * Sebastián Webb - Primary    Resident/Fellow/Other Assistant:  PGY 5 Ebertz    Procedure Summary  Anesthesia: General  ASA: III  Anesthesia Staff: Anesthesiologist: Abhijit Buckner MD; Abhijit Calvin MD  CRNA: NADINE Asencio-CRNA  C-AA: LOBITO Mar  Estimated Blood Loss: 5mL  Intra-op Medications:   Medication Name Total Dose   BUPivacaine HCl (Marcaine) 0.5 % (5 mg/mL) injection 10 mL   sodium chloride 0.9 % irrigation solution 1,000 mL              Anesthesia Record               Intraprocedure I/O Totals          Intake    lactated Ringer's infusion 1000.00 mL    Total Intake 1000 mL          Specimen: No specimens collected     Staff:   Circulator: Georgette Velez RN; Violeta An RN  Relief Scrub: Yumiko Cyr  Scrub Person: Bernarda Lambert         Drains and/or Catheters:   Urethral Catheter Non-latex 16 Fr. (Active)       Tourniquet Times:         Implants:  Implants       Type Name Action Serial No.      Surgical Mesh Sling Implant MESH, 3DMAX MID, 5 X 7 IN, X-LARGE RIGHT - SNA - [id number] Implanted NA     Surgical Mesh Sling Implant MESH, 3DMAX MID, 5 X 7 IN, X-LARGE LEFT - SNA - [id number] Implanted NA              Findings: bilateral direct    Indications: Abhijit Hickman is an 62 y.o. male who is having surgery for Bilateral inguinal hernia, without obstruction or gangrene, not specified as recurrent [K40.20].     The  patient was seen in the preoperative area. The risks, benefits, complications, treatment options, non-operative alternatives, expected recovery and outcomes were discussed with the patient. The possibilities of reaction to medication, pulmonary aspiration, injury to surrounding structures, bleeding, recurrent infection, the need for additional procedures, failure to diagnose a condition, and creating a complication requiring transfusion or operation were discussed with the patient. The patient concurred with the proposed plan, giving informed consent.  The site of surgery was properly noted/marked if necessary per policy. The patient has been actively warmed in preoperative area. Preoperative antibiotics have been ordered and given within 1 hours of incision. Venous thrombosis prophylaxis have been ordered including bilateral sequential compression devices    Procedure Details: Abhijit comes in for elective bilateral inguinal hernia repair.  Risks and benefits had been detailed in the office and informed consent was obtained.  He was brought to the OR placed supine.  Timeout was performed to confirm patient procedure.  Antibiotics were given general anesthesia ministered through the tracheal tube.  Sanchez catheter was placed.    We then prepped and draped sterilely.  Injected local made supraumbilical incision with a scalpel.  Fascia was incised and entered peritoneal cavity the balloon port.  We insufflated and introduced the 30 degree robotic camera.  I placed 8 mm robotic ports in the right upper quadrant and left upper quadrant respectively.  We then docked the robot after introducing all her mesh needles.  Patient was noted to have bilateral direct hernias.  Starting on the right side we opened up her peritoneal flap starting laterally and worked our way lateral to medial across the top of the myopectineal orifice through the medial umbilical ligament.  Medially we exposed symphysis pubis and Larry's ligament.   Laterally we exposed transversus abdominis and psoas muscle.  Small cord lipoma was reduced.  Preperitoneal fat reduced from the direct defect.  The leading edge of the peritoneal sac was then gently peeled down away from cord structures.  This gave us our critical view.  Extra-large 3D max patch was arrayed open the myopectineal orifice and then it was secured inferomedially and cephalad to the abdominal wall with interrupted 3-0 Vicryl sutures.  The peritoneal flap was then reapproximated with a running 3 0 V-Loc absorbable suture.  Similar dissection was done on the left side.  This time we started medial and worked our way medial to lateral across the top of the myopectineal orifice with scissors and cautery.  We connected our medial dissection and then laterally exposed transversus abdominis and psoas muscle.  Cord lipoma was reduced.  Leading edge of the peritoneal flap was gently peeled down away from cord structures.  Preperitoneal fat was reduced from the direct defect.  Again the extra-large 3D max patch was introduced and secured inferomedially to Larry's ligament cephalad to the abdominal wall with 3-0 Vicryl sutures.  Again we closed the peritoneal flap with a running 3 0 V-Loc.  All the needles were accounted for and removed.  We then undocked the robot removed our ports and closed the umbilical fascia with 0 Vicryl.  Skin incisions were closed with 4-0 Monocryl and Dermabond.  Patient was ultimately transferred recovery room extubated in satisfactory condition    Complications:  None; patient tolerated the procedure well.    Disposition: PACU - hemodynamically stable.  Condition: stable         Additional Details: home    Attending Attestation: I was present and scrubbed for the entire procedure.    Sebastián Webb  Phone Number: [phone]

## 2023-10-13 NOTE — ANESTHESIA PROCEDURE NOTES
Airway  Date/Time: 10/13/2023 10:13 AM  Urgency: elective    Airway not difficult    Staffing  Performed: CRNA   Authorized by: Abhijit Calvin MD    Performed by: NADINE Asencio-KAYCEE  Patient location during procedure: OR    Indications and Patient Condition  Indications for airway management: anesthesia and airway protection  Spontaneous ventilation: present  Sedation level: deep  Preoxygenated: yes  Patient position: sniffing  Mask difficulty assessment: 1 - vent by mask  No planned trial extubation    Final Airway Details  Final airway type: endotracheal airway      Successful airway: ETT  Cuffed: yes   Successful intubation technique: direct laryngoscopy  Facilitating devices/methods: intubating stylet  Endotracheal tube insertion site: oral  Blade: Peyman  Blade size: #4  ETT size (mm): 8.0  Cormack-Lehane Classification: grade I - full view of glottis  Placement verified by: chest auscultation and capnometry   Cuff volume (mL): 7  Measured from: gums  ETT to gums (cm): 24  Number of attempts at approach: 1

## 2023-10-13 NOTE — BRIEF OP NOTE
Date: 10/13/2023  OR Location: [facility] OR    Name: Abhijit Hickman, : 1960, Age: 62 y.o., MRN: [id number], Sex: male    Diagnosis  Pre-op Diagnosis     * Bilateral inguinal hernia, without obstruction or gangrene, not specified as recurrent [K40.20] Post-op Diagnosis     * Bilateral inguinal hernia, without obstruction or gangrene, not specified as recurrent [K40.20]     Procedures    * Robotic Bilateral Inguinal Hernia Repair with Mesh Placement    Surgeons      * Sebastián Webb - Primary    Resident/Fellow/Other Assistant:  Abhijit Olivarez    Procedure Summary  Anesthesia: General  ASA: III  Anesthesia Staff: Anesthesiologist: Abhijit Buckner MD; Abhijit Calvin MD  CRNA: NADINE Asencio-CRNA  C-AA: LOBITO Mar  Estimated Blood Loss: 6mL  Intra-op Medications:   Medication Name Total Dose   BUPivacaine HCl (Marcaine) 0.5 % (5 mg/mL) injection 19 mL   sodium chloride 0.9 % irrigation solution 1,000 mL              Anesthesia Record               Intraprocedure I/O Totals          Intake    lactated Ringer's infusion 1000.00 mL    Total Intake 1000 mL          Specimen: No specimens collected     Staff:   Circulator: Georgette Velez RN; Violeta An RN  Relief Scrub: Yumiko Cyr  Scrub Person: Bernarda Lambert          Findings: Bilateral direct inguinal hernias     Complications:  None; patient tolerated the procedure well.     Disposition: PACU - hemodynamically stable.  Condition: stable  Specimens Collected: No specimens collected  Attending Attestation:     Sebastián Webb  Phone Number: [phone]

## 2023-10-13 NOTE — ANESTHESIA POSTPROCEDURE EVALUATION
Patient: Abhijit Hickman    Procedure Summary       Date: 10/13/23 Room / Location: [facility] 08 / [facility]    Anesthesia Start: 1002 Anesthesia Stop: 1220    Procedure: Robotic Bilateral Inguinal Hernia Repair with Mesh Placement (Bilateral: Abdomen) Diagnosis:       Bilateral inguinal hernia, without obstruction or gangrene, not specified as recurrent      (Bilateral inguinal hernia, without obstruction or gangrene, not specified as recurrent [K40.20])    Surgeons: Sebastián Webb MD Responsible Provider: SOPHIE Asencio    Anesthesia Type: general ASA Status: 3            Anesthesia Type: general    Vitals Value Taken Time   BP  10/13/23 1222   Temp  10/13/23 1222   Pulse  10/13/23 1222   Resp  10/13/23 1222   SpO2  10/13/23 1222       Anesthesia Post Evaluation    Patient location during evaluation: PACU  Patient participation: complete - patient participated  Level of consciousness: awake  Pain score: 3  Pain management: adequate  Multimodal analgesia pain management approach  Airway patency: patent  Two or more strategies used to mitigate risk of obstructive sleep apnea  Cardiovascular status: acceptable  Respiratory status: acceptable  Hydration status: acceptable        No notable events documented.

## 2023-10-13 NOTE — ANESTHESIA PREPROCEDURE EVALUATION
Patient: Abhijit Hickman    Procedure Information       Anesthesia Start Date/Time: 10/13/23 1002    Procedure: Robotic Bilateral Inguinal Hernia Repair vs Laparoscopic; Mesh Placement (Bilateral)    Location: [facility] OR 08 / [facility] OR    Surgeons: Sebastián Webb MD            Relevant Problems   Cardiovascular   (+) Paroxysmal A-fib (CMS/HCC)      GI   (+) Gastrointestinal hemorrhage      Neuro/Psych   (+) Cervical radiculopathy   (+) Generalized anxiety disorder   (+) Left median nerve neuropathy   (+) Major depressive disorder, single episode, severe without psychotic features (CMS/HCC)      Hematology   (+) Anemia   (+) B-cell lymphoma (CMS/HCC)   (+) Lymphoma (CMS/HCC)      Eyes, Ears, Nose, and Throat   (+) Hearing loss       Clinical information reviewed:   Tobacco  Allergies  Meds   Med Hx  Surg Hx   Fam Hx  Soc Hx        NPO/Void Status  Carbonhydrate Drink Given Prior to Surgery? : N  Date of Last Liquid: 10/13/23  Time of Last Liquid: 0500  Date of Last Solid: 10/12/23  Time of Last Solid: 1700  Last Intake Type: Clear fluids (sip of water)  Time of Last Void: 0700           Past Medical History:   Diagnosis Date   • Anemia    • Depression    • History of B-cell lymphoma    • Other abnormalities of gait and mobility 07/12/2018    Gait, antalgic   • Other conditions influencing health status     No significant past medical history   • Other enthesopathies, not elsewhere classified 07/12/2018    Capsulitis of foot, right   • Paroxysmal A-fib (CMS/HCC)    • Personal history of other diseases of the digestive system 08/23/2021    History of rectal bleeding   • Personal history of other specified conditions 09/27/2021    History of abnormal weight loss   • Raynaud disease    • Thoracic outlet syndrome       Past Surgical History:   Procedure Laterality Date   • APPENDECTOMY  05/22/2018    Appendectomy   • CARDIOVERSION     • OTHER SURGICAL HISTORY  05/17/2022    Oral surgery     Social History      Tobacco Use   • Smoking status: Never     Passive exposure: Never   • Smokeless tobacco: Never   Substance Use Topics   • Alcohol use: Never   • Drug use: Never      Current Outpatient Medications   Medication Instructions   • alpha lipoic acid 600 mg capsule oral   • amoxicillin-pot clavulanate (Augmentin) 500-125 mg tablet 500 mg, oral, 2 times daily, Until gone   • cholecalciferol (VITAMIN D3) 50 mcg, oral   • coenzyme Q-10 (CO Q-10) 200 mg, oral, 3 times daily   • gabapentin (NEURONTIN) 100 mg, oral   • gabapentin (NEURONTIN) 400 mg, oral, 3 times daily   • ibuprofen 600 mg, oral, Every 6 hours PRN   • melatonin 10 mg, oral, Nightly   • metoprolol tartrate (LOPRESSOR) 25 mg, oral, 2 times daily   • nitroglycerin (Nitrostat) 0.4 mg SL tablet DISSOLVE 1 TABLET UNDER THE TONGUE AS NEEDED FOR CHEST PAIN.   • oxyCODONE (ROXICODONE) 5 mg, oral, Every 6 hours PRN   • polyethylene glycol (GLYCOLAX, MIRALAX) 17 g, oral, Daily PRN   • traZODone (DESYREL) 100 mg, oral, Nightly   • traZODone (DESYREL) 50 mg, oral, Nightly, If trazodone 100mg is ineffective. May repeat once as needed for a total of 200mg at bedtime   • vitamin B complex vit C no.4 (SUPER B COMPLEX + C ORAL) 1 capsule, oral, 2 times daily      No Known Allergies     Chemistry    Lab Results   Component Value Date/Time     09/27/2023 1623    K 4.1 09/27/2023 1623     09/27/2023 1623    CO2 29 09/27/2023 1623    BUN 12 09/27/2023 1623    CREATININE 0.90 09/27/2023 1623    Lab Results   Component Value Date/Time    CALCIUM 9.2 09/27/2023 1623    ALKPHOS 42 08/25/2023 1648    AST 14 08/25/2023 1648    ALT 11 08/25/2023 1648    BILITOT 0.9 09/08/2023 1319          Lab Results   Component Value Date/Time    WBC 5.5 09/27/2023 1623    HGB 13.6 09/27/2023 1623    HCT 40.5 (L) 09/27/2023 1623     09/27/2023 1623     Lab Results   Component Value Date/Time    PROTIME 10.8 03/11/2023 1857    INR 0.9 03/11/2023 1857     No results found for this or  any previous visit (from the past 4464 hour(s)).  No results found for this or any previous visit from the past 1095 days.   HAYLEE 3/23::  CONCLUSIONS:  1. Left ventricular systolic function is normal with a 55% estimated ejection fraction.  2. No left atrial thrombus.  3. There is no evidence of a patent foramen ovale.    Visit Vitals  /80   Pulse 56   Temp 36.2 °C (97.2 °F) (Temporal)   Resp 15   Ht 1.829 m (6')   Wt 78.5 kg (173 lb 1 oz)   SpO2 100%   BMI 23.47 kg/m²   Smoking Status Never   BSA 2 m²       Anesthesia Evaluation     Physical Exam     Anesthesia Plan    ASA 3     general     intravenous induction   Postoperative administration of opioids is intended.  Anesthetic plan and risks discussed with patient.    Plan discussed with CRNA.

## 2023-10-27 ENCOUNTER — OFFICE VISIT (OUTPATIENT)
Dept: CARDIOLOGY | Facility: CLINIC | Age: 63
End: 2023-10-27
Payer: COMMERCIAL

## 2023-10-27 VITALS
RESPIRATION RATE: 16 BRPM | SYSTOLIC BLOOD PRESSURE: 138 MMHG | HEART RATE: 78 BPM | DIASTOLIC BLOOD PRESSURE: 82 MMHG | OXYGEN SATURATION: 98 % | BODY MASS INDEX: 22.65 KG/M2 | WEIGHT: 167 LBS

## 2023-10-27 DIAGNOSIS — I48.0 PAROXYSMAL A-FIB (MULTI): Primary | ICD-10-CM

## 2023-10-27 DIAGNOSIS — I45.10 INCOMPLETE RIGHT BUNDLE BRANCH BLOCK (RBBB): ICD-10-CM

## 2023-10-27 PROCEDURE — 99213 OFFICE O/P EST LOW 20 MIN: CPT | Performed by: STUDENT IN AN ORGANIZED HEALTH CARE EDUCATION/TRAINING PROGRAM

## 2023-10-27 PROCEDURE — 1036F TOBACCO NON-USER: CPT | Performed by: STUDENT IN AN ORGANIZED HEALTH CARE EDUCATION/TRAINING PROGRAM

## 2023-10-27 RX ORDER — CHLORHEXIDINE GLUCONATE ORAL RINSE 1.2 MG/ML
SOLUTION DENTAL
COMMUNITY
Start: 2023-09-27 | End: 2023-10-27 | Stop reason: ALTCHOICE

## 2023-10-27 NOTE — PROGRESS NOTES
[facility] Outpatient Follow-up Visit     Reason for Visit: follow-up for paroxysmal afib.     HPI: Abhijit Hickman is a 62 y.o.  male who presents today for a follow-up for paroxysmal atrial fibrillation. Past medical history of remote paroxysmal afib (Dx March 2023; s/p HAYLEE guided DCCV 3/13/2023), B-cell lymphoma s/p radiation, cervical radiculopathy, and hx of thoracic outlet syndrome.      Previously admitted for syncope, found to have afib with RVR. Patient noted sudden fatigue, dyspnea, and tachycardia the day prior to admission. Per his HR monitor, his HR was in 120s when his typical resting HR was in ~60 bpm. Patient remains very active with skiing and biking. He went to the bathroom and felt like he was going ot pass out so he laid down on floor. He ended up losing consciousness and later woke up on the floor. He denied any trauma, chest pain.      In the ED, ECG showed atrial fibrillation Chest XR showed no evidence of acute cardiopulmonary disease Placed on heparin ggt and initiated on metoprolol for rate control. Cardiology consulted for new diagnosis of afib.      Abhijit was evaluated at bedside by cardiology consult service on 3/12/2023. He remained in afib. He notes feeling generalized fatigue, mild dyspnea when in afib. No chest pains. No prior cardiac history. Previously tolerating vigorous activity without significant symptoms. As patient remained in afib > recommended HAYLEE guided DCCV > s/p successful HAYLEE guided DCCV on 3/13/2023; d/c on rivaroxaban 20 mg daily (for minimum of 4 weeks), metoprolol.      Patient presented to cardiology clinic on 4/7/2023. Abhijit feels at his baseline state of health, tolerating vigorous activity without significant symptoms. No further episodes of symptomatic afib per patient. No bleeding issues on rivaroxaban. Patient [id number] score is 0.     Patient presented to cardiology clinic on 10/27/2023. Recent hernia surgery; tolerated well. Heart  rate well controlled; no recent episodes of atrial fibrillation. Tolerating physical activity without active cardiac complaints. No recent syncope.      PMHx: As above  PSx: Appendectomy, oral surgery  FMHx: Mother T2DM, father T2DM and multiple myeloma  Social Hx: Never smoker, denies EtOH, denies illicit drug     ROS: 10 point ROS reviewed and otherwise negative except what is stated in HPI.     Prior CV testing:      HAYLEE (3/13/2023)- no LA or ALTA thrombus; s/p successful DCCV from afib with RVR to sinus rhythm       Review of Systems:  Review of Systems   Constitutional: Negative.   HENT: Negative.     Eyes: Negative.    Cardiovascular: Negative.    Respiratory: Negative.     Endocrine: Negative.    Hematologic/Lymphatic: Negative.    Skin: Negative.    Musculoskeletal: Negative.    Gastrointestinal: Negative.    Genitourinary: Negative.    Neurological: Negative.    Psychiatric/Behavioral: Negative.         Outpatient Medications:    Current Outpatient Medications:     alpha lipoic acid 600 mg capsule, Take by mouth., Disp: , Rfl:     cholecalciferol (Vitamin D3) 50 mcg (2,000 unit) capsule, Take 1 capsule (50 mcg) by mouth., Disp: , Rfl:     coenzyme Q-10 (Co Q-10) 200 mg capsule, Take 1 capsule (200 mg) by mouth 3 times a day., Disp: , Rfl:     gabapentin (Neurontin) 400 mg capsule, Take 1 capsule (400 mg) by mouth 3 times a day., Disp: , Rfl:     ibuprofen 600 mg tablet, Take 1 tablet (600 mg) by mouth every 6 hours if needed for moderate pain (4 - 6) for up to 20 doses., Disp: 20 tablet, Rfl: 0    melatonin 10 mg capsule, Take 1 capsule (10 mg) by mouth once daily at bedtime., Disp: , Rfl:     metoprolol tartrate (Lopressor) 25 mg tablet, Take 1 tablet (25 mg) by mouth 2 times a day., Disp: , Rfl:     nitroglycerin (Nitrostat) 0.4 mg SL tablet, DISSOLVE 1 TABLET UNDER THE TONGUE AS NEEDED FOR CHEST PAIN., Disp: , Rfl:     polyethylene glycol (Glycolax, Miralax) 17 gram packet, Take 17 g by mouth once daily  as needed (for constipation) for up to 10 doses., Disp: 10 packet, Rfl: 0    traZODone (Desyrel) 100 mg tablet, Take 1 tablet (100 mg) by mouth once daily at bedtime., Disp: , Rfl:     vitamin B complex vit C no.4 (SUPER B COMPLEX + C ORAL), Take 1 capsule by mouth 2 times a day., Disp: , Rfl:      Last Recorded Vitals  /82 (BP Location: Right arm, Patient Position: Sitting)   Pulse 78   Resp 16   Wt 75.8 kg (167 lb)   SpO2 98%   BMI 22.65 kg/m²     Physical Exam:    Physical Exam  Constitutional:       General: He is not in acute distress.  HENT:      Head: Normocephalic.      Mouth/Throat:      Mouth: Mucous membranes are moist.   Eyes:      Extraocular Movements: Extraocular movements intact.      Conjunctiva/sclera: Conjunctivae normal.   Cardiovascular:      Rate and Rhythm: Normal rate and regular rhythm.      Heart sounds: No murmur heard.  Pulmonary:      Effort: Pulmonary effort is normal. No respiratory distress.      Breath sounds: Normal breath sounds.   Abdominal:      General: There is no distension.      Palpations: Abdomen is soft.   Musculoskeletal:      Right lower leg: No edema.      Left lower leg: No edema.   Skin:     General: Skin is warm and dry.   Neurological:      General: No focal deficit present.      Mental Status: He is alert.      Cranial Nerves: No cranial nerve deficit.      Motor: No weakness.   Psychiatric:         Mood and Affect: Mood normal.         Behavior: Behavior normal.         Lab/Radiology/Diagnostic Review:    Labs    Lab Results   Component Value Date    GLUCOSE 84 09/27/2023    CALCIUM 9.2 09/27/2023     09/27/2023    K 4.1 09/27/2023    CO2 29 09/27/2023     09/27/2023    BUN 12 09/27/2023    CREATININE 0.90 09/27/2023       Lab Results   Component Value Date    WBC 5.5 09/27/2023    HGB 13.6 09/27/2023    HCT 40.5 (L) 09/27/2023    MCV 90 09/27/2023     09/27/2023       Lab Results   Component Value Date    CHOL 159 08/25/2023    CHOL  "181 08/26/2022    CHOL 193 08/25/2021     Lab Results   Component Value Date    HDL 52.9 08/25/2023    HDL 48.9 08/26/2022    HDL 50.0 08/25/2021     No results found for: \"LDLCALC\"  Lab Results   Component Value Date    TRIG 48 08/25/2023    TRIG 52 08/26/2022    TRIG 59 08/25/2021     No components found for: \"CHOLHDL\"    Lab Results   Component Value Date    BNP 56 03/11/2023       Lab Results   Component Value Date    TSH 2.25 08/25/2023       Assessment:   62 y.o.  male who presents today for a follow-up for paroxysmal atrial fibrillation. Past medical history of remote paroxysmal afib (Dx March 2023; s/p HAYLEE guided DCCV 3/13/2023), B-cell lymphoma s/p radiation, cervical radiculopathy, and hx of thoracic outlet syndrome.     Patient presented to cardiology clinic on 10/27/2023. Currently asymptomatic from a cardiac perspective. Most recent ECG showed an incomplete right bundle branch block (seen on prior ECG) > no indications for pacemaker at this time.     We will defer blood thinning at this time as patient  have not had recurrent atrial fibrillation and in light of prior  anemia (which has now improved on repeat labs).     Overall Plan:  1) Paroxysmal afib ([id number] 0)- s/p HAYLEE guided DCCV 3/13/2023 > completed ~ 4 weeks total of systemic anti-coagulation with rivaroxaban > given [id number] 0 long-term anti-coagulation deferred;; no recent recurrence of atrial fibrillation, monitor clinically      2) Syncope- no further episodes of syncope; likely exacerbated by prior afib with RVR     3) Hx prior elevated troponin- type II MI (supply/demand mismatch) in setting of afib with RVR; ACS was not suspected; patient tolerating vigorous physical activity (skiing, biking) without symptoms.     Disposition- return to cardiology clinic in ~ 1 year    Thank you for your visit today. Please contact our office with any questions.     Pancho Gay MD        "

## 2023-10-27 NOTE — PATIENT INSTRUCTIONS
No recent episodes of atrial fibrillation. Your most recent ECG showed an incomplete right bundle branch block (seen on prior ECG) > no indications for pacemaker at this time.     We will defer blood thinning at this time as you have not had recurrent atrial fibrillation and in light of your recent anemia (which has now improved on repeat labs).     We will see you back in heart clinic in one year or earlier if needed.      Thank you for your visit today. Please contact our office (via [employer] or phone) with any additional questions.     [facility] [place]    Marilu, LINDSEY/Clinic Nurse for:    Dr. Deidra Peck    [address], [address]  [place] [address]    Phone: [phone] Press Option 5 then Option 3 to speak with the Clinic Nurse (Marilu)    _____    To Reach:    Billing Questions -    [phone]  Scheduling / Rescheduling -  Option 1  Refills / Medication Requests -  Option 3  General Office / [place] -  Option 4  Results -     Option 6  Medical Records -    Option 7  Repeat Options -    Option 9

## 2023-10-31 ENCOUNTER — OFFICE VISIT (OUTPATIENT)
Dept: SURGERY | Facility: CLINIC | Age: 63
End: 2023-10-31
Payer: COMMERCIAL

## 2023-10-31 DIAGNOSIS — Z09 SURGERY FOLLOW-UP: Primary | ICD-10-CM

## 2023-10-31 PROCEDURE — 1036F TOBACCO NON-USER: CPT | Performed by: SURGERY

## 2023-10-31 PROCEDURE — 99024 POSTOP FOLLOW-UP VISIT: CPT | Performed by: SURGERY

## 2023-10-31 NOTE — LETTER
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  He will follow-up with me in 3 to 4 months as needed.  Thanks again.    Fabricio

## 2023-10-31 NOTE — LETTER
October 31, 2023     Patient: Abhijit Hickman   YOB: 1960   Date of Visit: 10/31/2023       To Whom It May Concern:    It is my medical opinion that Abhijit Hickman may return to work on November 1, 2023, with no restrictions .    If you have any questions or concerns, please don't hesitate to call.         Sincerely,        Sebastián Webb MD    CC: No Recipients

## 2023-10-31 NOTE — PROGRESS NOTES
Abhijit Hickman is a 62 y.o. male on day 0 of admission presenting with     Assessment/Plan   Abhijit is recovering as expected following robotic assisted bilateral inguinal hernia surgery.  We outlined the expected time course for when he can resume unrestricted activities.  Paperwork was filled out for his employer today.  See me in 3 to 4 months    Subjective   No complaints at this time.  He is now 18 days status post laparoscopic robotic assisted bilateral inguinal hernia repair.  Eating well having normal bowel movements.  Range of motion unrestricted       Objective     Physical Exam  NAD  A&Ox3  Non icteric  CTA  RR  Abdomen soft min tender. Wounds clean, intact.  No clinical evidence of recurrent hernia  Extremities warm, well perfused     Last Recorded Vitals  There were no vitals taken for this visit.  Intake/Output last 3 Shifts:  No intake/output data recorded.    Relevant Results    Scheduled medications    Continuous medications    PRN medications      No results found for this or any previous visit (from the past 24 hour(s)).        I spent 25 minutes in the professional and overall care of this patient.      Sebastián Webb MD

## 2023-10-31 NOTE — PROGRESS NOTES
History Of Present Illness  Abhijit Hickman is a 62 y.o. male presenting with ***.     Past Medical History  Past Medical History:   Diagnosis Date    Anemia     Depression     History of B-cell lymphoma     Other abnormalities of gait and mobility 07/12/2018    Gait, antalgic    Other conditions influencing health status     No significant past medical history    Other enthesopathies, not elsewhere classified 07/12/2018    Capsulitis of foot, right    Paroxysmal A-fib (CMS/HCC)     Personal history of other diseases of the digestive system 08/23/2021    History of rectal bleeding    Personal history of other specified conditions 09/27/2021    History of abnormal weight loss    Raynaud disease     Thoracic outlet syndrome        Surgical History  Past Surgical History:   Procedure Laterality Date    APPENDECTOMY  05/22/2018    Appendectomy    CARDIOVERSION      OTHER SURGICAL HISTORY  05/17/2022    Oral surgery        Social History  He reports that he has never smoked. He has never been exposed to tobacco smoke. He has never used smokeless tobacco. He reports that he does not drink alcohol and does not use drugs.    Family History  Family History   Problem Relation Name Age of Onset    Diabetes Mother      Stroke Mother      Hypertension Father      Multiple myeloma Father      Diabetes Brother      Colon cancer Paternal Grandmother          Allergies  Patient has no known allergies.    Review of Systems     Physical Exam     Last Recorded Vitals  There were no vitals taken for this visit.    Relevant Results  {If you would like to pull in Medications, type .meds     If you would like to pull in Lab results for the last 24 hours, type .[id number]    If you would like to pull in Imaging results, type .imgrslt :99}      ***     Assessment/Plan   {Assess/PlanSmartLinks:65199}    ***       I spent *** minutes in the professional and overall care of this patient.      Sebastián Webb MD

## 2023-11-06 ASSESSMENT — ENCOUNTER SYMPTOMS
GASTROINTESTINAL NEGATIVE: 1
CARDIOVASCULAR NEGATIVE: 1
ENDOCRINE NEGATIVE: 1
PSYCHIATRIC NEGATIVE: 1
CONSTITUTIONAL NEGATIVE: 1
RESPIRATORY NEGATIVE: 1
HEMATOLOGIC/LYMPHATIC NEGATIVE: 1
EYES NEGATIVE: 1
MUSCULOSKELETAL NEGATIVE: 1
NEUROLOGICAL NEGATIVE: 1

## 2023-12-06 ENCOUNTER — CLINICAL DOCUMENTATION ONLY (OUTPATIENT)
Dept: PAIN MEDICINE | Facility: CLINIC | Age: 63
End: 2023-12-06
Payer: COMMERCIAL

## 2023-12-06 ENCOUNTER — TELEMEDICINE (OUTPATIENT)
Dept: PAIN MEDICINE | Facility: CLINIC | Age: 63
End: 2023-12-06
Payer: COMMERCIAL

## 2023-12-06 DIAGNOSIS — R20.0 NUMBNESS AND TINGLING IN LEFT HAND: ICD-10-CM

## 2023-12-06 DIAGNOSIS — R20.2 NUMBNESS AND TINGLING IN LEFT HAND: ICD-10-CM

## 2023-12-06 DIAGNOSIS — I73.00 RAYNAUD'S PHENOMENON WITHOUT GANGRENE: Primary | ICD-10-CM

## 2023-12-06 PROCEDURE — 99213 OFFICE O/P EST LOW 20 MIN: CPT | Performed by: NURSE PRACTITIONER

## 2023-12-06 PROCEDURE — 99213 OFFICE O/P EST LOW 20 MIN: CPT | Mod: 95,ZK | Performed by: NURSE PRACTITIONER

## 2023-12-06 RX ORDER — NITROGLYCERIN 0.4 MG/1
TABLET SUBLINGUAL
Qty: 90 TABLET | Refills: 0 | Status: SHIPPED | OUTPATIENT
Start: 2023-12-06

## 2023-12-06 RX ORDER — TRAZODONE HYDROCHLORIDE 300 MG/1
150 TABLET ORAL NIGHTLY
COMMUNITY
Start: 2023-11-06

## 2023-12-06 RX ORDER — GABAPENTIN 300 MG/1
300 CAPSULE ORAL 3 TIMES DAILY
Qty: 270 CAPSULE | Refills: 2 | Status: SHIPPED | OUTPATIENT
Start: 2023-12-06 | End: 2024-01-15 | Stop reason: SDUPTHER

## 2023-12-06 ASSESSMENT — ENCOUNTER SYMPTOMS
DIARRHEA: 0
NAUSEA: 0
VOMITING: 0
CHILLS: 0
SLEEP DISTURBANCE: 1
HEADACHES: 0
CHEST TIGHTNESS: 0
NUMBNESS: 0
PALPITATIONS: 0
FREQUENCY: 0
FATIGUE: 0
WHEEZING: 0
FEVER: 0
WEAKNESS: 0
CONFUSION: 0
SHORTNESS OF BREATH: 0

## 2023-12-06 ASSESSMENT — PAIN - FUNCTIONAL ASSESSMENT: PAIN_FUNCTIONAL_ASSESSMENT: 0-10

## 2023-12-06 ASSESSMENT — PAIN SCALES - GENERAL: PAINLEVEL_OUTOF10: 0 - NO PAIN

## 2023-12-06 ASSESSMENT — PAIN DESCRIPTION - DESCRIPTORS: DESCRIPTORS: PINS AND NEEDLES

## 2023-12-06 NOTE — PROGRESS NOTES
Chief Complain  Follow-up visit for chronic cervical pain radiating to left upper extremity.  History Of Present Illness  Abhijit Hickman is a 63 y.o. male here for neck pain radiating to bilateral traps. The patient rates the pain at 0 on a scale from 0-10.  The patient describes pain as dull.  The pain is worsened by no aggravating factors identified and is alleviated by medications nonsteroidal anti-inflammatory drugs and Tylenol and position change.  Since the last visit the pain has improved.    The patient denies any fever, chills, weight loss, weakness, bladder/ bowel incontinence, history of cancer, history of IV drug abuse, recent trauma.     Past Medical History  He has a past medical history of Anemia, Depression, History of B-cell lymphoma, Other abnormalities of gait and mobility (07/12/2018), Other conditions influencing health status, Other enthesopathies, not elsewhere classified (07/12/2018), Paroxysmal A-fib (CMS/[facility]), Personal history of other diseases of the digestive system (08/23/2021), Personal history of other specified conditions (09/27/2021), Raynaud disease, and Thoracic outlet syndrome.    Surgical History  He has a past surgical history that includes Appendectomy (05/22/2018); Other surgical history (05/17/2022); and Cardioversion.     Social History  He reports that he has never smoked. He has never been exposed to tobacco smoke. He has never used smokeless tobacco. He reports that he does not drink alcohol and does not use drugs.    Family History  Family History   Problem Relation Name Age of Onset    Diabetes Mother      Stroke Mother      Hypertension Father      Multiple myeloma Father      Diabetes Brother      Colon cancer Paternal Grandmother          Allergies  Patient has no known allergies.    Review of Systems  Review of Systems   Constitutional:  Negative for chills, fatigue and fever.   Respiratory:  Negative for chest tightness, shortness of breath and wheezing.     Cardiovascular:  Negative for chest pain and palpitations.   Gastrointestinal:  Negative for diarrhea, nausea and vomiting.   Endocrine:        Has night sweats 2-3 times per week   Genitourinary:  Negative for frequency.   Neurological:  Negative for weakness, numbness and headaches.   Psychiatric/Behavioral:  Positive for sleep disturbance. Negative for behavioral problems and confusion.         Physical Exam  Alert and oriented x 3 no acute distress      Reviewed Labs  Lab Results   Component Value Date    GLUCOSE 84 09/27/2023    CALCIUM 9.2 09/27/2023     09/27/2023    K 4.1 09/27/2023    CO2 29 09/27/2023     09/27/2023    BUN 12 09/27/2023    CREATININE 0.90 09/27/2023      Assessment/Plan     Abhijit Hickman is a 63 y.o. 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  He is really doing well overall and pleased with his progress.  He continues to take neuro supplements and gabapentin 300 mg 3 times a day.  He denies medication side effects.  He continues to be very active with exercise and riding his bike and is a  and looking forward to the winter.  He does report using nitro to help control his left hand vasospasms related to Willem's.  He continues to struggle with divorce issues and problems getting things finalized.  He talks to a counselor 3 times a month which is getting positive feedback that he is doing well.  I encouraged him to continue with this treatment, and reassured him he is doing very well and has a lot to offer a .  Recommended joining groups and event activities where he might have the opportunity to meet someone.  Encouraged to continue  with home stretching exercises and increasing physical activity as tolerated.  Provided refill of gabapentin and nitroglycerin.  Patient verbalized understanding plan of care and is to follow-up in 6 months or sooner if needed.  I spent 20 minutes in the professional and overall care of this patient.     Evelio Iniguez, APRN-CNP

## 2024-01-15 DIAGNOSIS — I73.00 RAYNAUD'S PHENOMENON WITHOUT GANGRENE: ICD-10-CM

## 2024-01-15 RX ORDER — GABAPENTIN 300 MG/1
300 CAPSULE ORAL 3 TIMES DAILY
Qty: 270 CAPSULE | Refills: 0 | Status: SHIPPED | OUTPATIENT
Start: 2024-01-15 | End: 2024-03-22 | Stop reason: SDUPTHER

## 2024-02-12 ENCOUNTER — TELEPHONE (OUTPATIENT)
Dept: SURGERY | Facility: CLINIC | Age: 64
End: 2024-02-12
Payer: COMMERCIAL

## 2024-02-12 NOTE — TELEPHONE ENCOUNTER
Patient called with 90 day follow up report. He states that everything is going well and he has no issues.

## 2024-03-01 ENCOUNTER — TELEPHONE (OUTPATIENT)
Dept: CARDIOLOGY | Facility: CLINIC | Age: 64
End: 2024-03-01
Payer: COMMERCIAL

## 2024-03-01 NOTE — TELEPHONE ENCOUNTER
Patient has been prescribed Prazosin to help with nightmares but wants to make sure its ok to take since he had Afib last March. Waiting to start medication until he hears back from our office. Please call and advise, thank you

## 2024-03-22 DIAGNOSIS — I73.00 RAYNAUD'S PHENOMENON WITHOUT GANGRENE: ICD-10-CM

## 2024-03-22 RX ORDER — GABAPENTIN 300 MG/1
300 CAPSULE ORAL 3 TIMES DAILY
Qty: 270 CAPSULE | Refills: 3 | OUTPATIENT
Start: 2024-03-22

## 2024-03-25 RX ORDER — GABAPENTIN 300 MG/1
300 CAPSULE ORAL 3 TIMES DAILY
Qty: 270 CAPSULE | Refills: 0 | Status: SHIPPED | OUTPATIENT
Start: 2024-04-15 | End: 2024-07-14

## 2024-06-03 ENCOUNTER — OFFICE VISIT (OUTPATIENT)
Dept: PRIMARY CARE | Facility: CLINIC | Age: 64
End: 2024-06-03
Payer: COMMERCIAL

## 2024-06-03 VITALS
SYSTOLIC BLOOD PRESSURE: 118 MMHG | OXYGEN SATURATION: 98 % | DIASTOLIC BLOOD PRESSURE: 75 MMHG | HEIGHT: 72 IN | BODY MASS INDEX: 23.66 KG/M2 | WEIGHT: 174.7 LBS | HEART RATE: 61 BPM

## 2024-06-03 DIAGNOSIS — Z48.02 ENCOUNTER FOR REMOVAL OF SUTURES: Primary | ICD-10-CM

## 2024-06-03 PROCEDURE — 99212 OFFICE O/P EST SF 10 MIN: CPT | Performed by: INTERNAL MEDICINE

## 2024-06-03 PROCEDURE — 1159F MED LIST DOCD IN RCRD: CPT | Performed by: INTERNAL MEDICINE

## 2024-06-03 ASSESSMENT — PATIENT HEALTH QUESTIONNAIRE - PHQ9
1. LITTLE INTEREST OR PLEASURE IN DOING THINGS: NOT AT ALL
SUM OF ALL RESPONSES TO PHQ9 QUESTIONS 1 AND 2: 0
2. FEELING DOWN, DEPRESSED OR HOPELESS: NOT AT ALL

## 2024-06-03 NOTE — PROGRESS NOTES
Subjective   Patient ID: Abhijit Hickman is a 63 y.o. male who presents for Suture / Staple Removal.    HPI   Got bitten by a dog 12 days ago.was playing with friend's dog and suddenly bit him  Went to urgent care and got stiches  Took augmentin for 10days  Reported to animal control  Dog had all vaccines  No pain now.  No fever or chills.  Finger is still somewhat swollen    Review of Systems  No dizziness headache or night sweats  No cough chest pain shortness of breath or palpitation  Objective   /75   Pulse 61   Ht 1.829 m (6')   Wt 79.2 kg (174 lb 11.2 oz)   SpO2 98%   BMI 23.69 kg/m²     Physical Exam  Laceration healing well.  Has some swelling of finger.  Nontender  After getting verbal informed consent sutures removed without any complication from right middle finger    Assessment/Plan   Problem List Items Addressed This Visit             ICD-10-CM    Encounter for removal of sutures - Primary Z48.02        Continue to clean daily and apply Band-Aid.  Call me with any worsening of symptoms

## 2024-07-25 ENCOUNTER — APPOINTMENT (OUTPATIENT)
Dept: PRIMARY CARE | Facility: CLINIC | Age: 64
End: 2024-07-25
Payer: COMMERCIAL

## 2024-07-25 VITALS
HEIGHT: 72 IN | HEART RATE: 55 BPM | DIASTOLIC BLOOD PRESSURE: 85 MMHG | SYSTOLIC BLOOD PRESSURE: 137 MMHG | WEIGHT: 169.2 LBS | OXYGEN SATURATION: 98 % | BODY MASS INDEX: 22.92 KG/M2

## 2024-07-25 DIAGNOSIS — C85.10 B-CELL LYMPHOMA, UNSPECIFIED B-CELL LYMPHOMA TYPE, UNSPECIFIED BODY REGION (MULTI): ICD-10-CM

## 2024-07-25 DIAGNOSIS — Z00.00 ANNUAL PHYSICAL EXAM: Primary | ICD-10-CM

## 2024-07-25 DIAGNOSIS — I48.0 PAROXYSMAL A-FIB (MULTI): ICD-10-CM

## 2024-07-25 PROBLEM — F32.2 MAJOR DEPRESSIVE DISORDER, SINGLE EPISODE, SEVERE WITHOUT PSYCHOTIC FEATURES (MULTI): Status: RESOLVED | Noted: 2023-09-20 | Resolved: 2024-07-25

## 2024-07-25 PROCEDURE — 3008F BODY MASS INDEX DOCD: CPT | Performed by: INTERNAL MEDICINE

## 2024-07-25 PROCEDURE — 1124F ACP DISCUSS-NO DSCNMKR DOCD: CPT | Performed by: INTERNAL MEDICINE

## 2024-07-25 PROCEDURE — 1159F MED LIST DOCD IN RCRD: CPT | Performed by: INTERNAL MEDICINE

## 2024-07-25 PROCEDURE — 99397 PER PM REEVAL EST PAT 65+ YR: CPT | Performed by: INTERNAL MEDICINE

## 2024-07-25 PROCEDURE — 1160F RVW MEDS BY RX/DR IN RCRD: CPT | Performed by: INTERNAL MEDICINE

## 2024-07-25 ASSESSMENT — PATIENT HEALTH QUESTIONNAIRE - PHQ9
2. FEELING DOWN, DEPRESSED OR HOPELESS: NOT AT ALL
1. LITTLE INTEREST OR PLEASURE IN DOING THINGS: NOT AT ALL
SUM OF ALL RESPONSES TO PHQ9 QUESTIONS 1 AND 2: 0

## 2024-07-25 NOTE — PROGRESS NOTES
Subjective   Patient ID: Abhijit Hickman is a 63 y.o. male who presents for Annual Exam.    HPI   Here for annual exam  Since last physical had hernia surgery.  Seen cardiology for paroxysmal A-fib.  No recurrence.  Not on medication  Undergoing treatment for thoracic outlet syndrome  Symptoms fairly controlled  Colonoscopy completed 2021  Not high risk for prostate cancer  Follows healthy diet and exercises regularly without symptoms  Review of Systems  GENERAL;:Denies weight changes,fatigue,fever,chills or sweats  HEENT:Denies headache,sorethroat,sinus drainage ,vision or hearing issues  CVS:No chest pain,sob or palpitation.No leg swelling  RESP:No c/c cough,cp,sob or wheezing  GI:NO abdominal pain,nausea,heartburn,vomiting,or bowel changes.  :No painful urination,frequency or hematuria.No incontinence  MSK:No joint pain or swelling  NEURO:No dizziness,weakness,numbness or tingling.No memory issues  PSYCH:No anxiety,depression or sleep issues.     arm numbness better .     Objective   /85   Pulse 55   Ht 1.829 m (6')   Wt 76.7 kg (169 lb 3.2 oz)   SpO2 98%   BMI 22.95 kg/m²     Physical Exam  In general, well-appearing, not in acute distress, alert and oriented.  HEENT: No pallor or icterus  Neck: No lymphadenopathy, no stiffness.  Supple.  .No JVD.No goiter  Chest: Clear to auscultation, good air entry.  CVS: S1 and S2 regular.  No murmur, no gallop, S3 or S4.  No peripheral edema.  No carotid bruit.  Abdomen: Soft, no tenderness, no hepatosplenomegaly.  Extremities: No calf tenderness.  No peripheral edema.   Neuro: No focal deficits.  Psych: Mood and affect normal.  Good judgment and insight   :declined  Assessment/Plan   Problem List Items Addressed This Visit             ICD-10-CM    B-cell lymphoma (Multi) C85.10    Paroxysmal A-fib (Multi) I48.0    Annual physical exam - Primary Z00.00    Relevant Orders    Comprehensive Metabolic Panel    Lipid Panel    CBC and Auto Differential     Prostate Specific Antigen     Preventive exam and counseling completed  Has good functional status  Independent with ADL's  Consider assigning healthcare power of        labs including Prostate cancer screen ordered  Discussed vaccinations.He will consider in future.    No recurrence of A-fib      Continue pain management follow-up     Had colonoscopy 2021

## 2024-07-26 ENCOUNTER — LAB (OUTPATIENT)
Dept: LAB | Facility: LAB | Age: 64
End: 2024-07-26
Payer: COMMERCIAL

## 2024-07-26 DIAGNOSIS — Z00.00 ANNUAL PHYSICAL EXAM: ICD-10-CM

## 2024-07-26 PROCEDURE — 84153 ASSAY OF PSA TOTAL: CPT

## 2024-07-26 PROCEDURE — 85025 COMPLETE CBC W/AUTO DIFF WBC: CPT

## 2024-07-26 PROCEDURE — 36415 COLL VENOUS BLD VENIPUNCTURE: CPT

## 2024-07-26 PROCEDURE — 80061 LIPID PANEL: CPT

## 2024-07-26 PROCEDURE — 80053 COMPREHEN METABOLIC PANEL: CPT

## 2024-07-27 LAB
ALBUMIN SERPL BCP-MCNC: 4.5 G/DL (ref 3.4–5)
ALP SERPL-CCNC: 52 U/L (ref 33–136)
ALT SERPL W P-5'-P-CCNC: 11 U/L (ref 10–52)
ANION GAP SERPL CALC-SCNC: 13 MMOL/L (ref 10–20)
AST SERPL W P-5'-P-CCNC: 14 U/L (ref 9–39)
BASOPHILS # BLD AUTO: 0.05 X10*3/UL (ref 0–0.1)
BASOPHILS NFR BLD AUTO: 0.9 %
BILIRUB SERPL-MCNC: 1.5 MG/DL (ref 0–1.2)
BUN SERPL-MCNC: 17 MG/DL (ref 6–23)
CALCIUM SERPL-MCNC: 9.5 MG/DL (ref 8.6–10.6)
CHLORIDE SERPL-SCNC: 106 MMOL/L (ref 98–107)
CHOLEST SERPL-MCNC: 214 MG/DL (ref 0–199)
CHOLESTEROL/HDL RATIO: 4.1
CO2 SERPL-SCNC: 24 MMOL/L (ref 21–32)
CREAT SERPL-MCNC: 1 MG/DL (ref 0.5–1.3)
EGFRCR SERPLBLD CKD-EPI 2021: 84 ML/MIN/1.73M*2
EOSINOPHIL # BLD AUTO: 0.14 X10*3/UL (ref 0–0.7)
EOSINOPHIL NFR BLD AUTO: 2.5 %
ERYTHROCYTE [DISTWIDTH] IN BLOOD BY AUTOMATED COUNT: 12.3 % (ref 11.5–14.5)
GLUCOSE SERPL-MCNC: 80 MG/DL (ref 74–99)
HCT VFR BLD AUTO: 40.9 % (ref 41–52)
HDLC SERPL-MCNC: 52.2 MG/DL
HGB BLD-MCNC: 13.9 G/DL (ref 13.5–17.5)
IMM GRANULOCYTES # BLD AUTO: 0.01 X10*3/UL (ref 0–0.7)
IMM GRANULOCYTES NFR BLD AUTO: 0.2 % (ref 0–0.9)
LDLC SERPL CALC-MCNC: 152 MG/DL
LYMPHOCYTES # BLD AUTO: 1.82 X10*3/UL (ref 1.2–4.8)
LYMPHOCYTES NFR BLD AUTO: 32.4 %
MCH RBC QN AUTO: 30.6 PG (ref 26–34)
MCHC RBC AUTO-ENTMCNC: 34 G/DL (ref 32–36)
MCV RBC AUTO: 90 FL (ref 80–100)
MONOCYTES # BLD AUTO: 0.4 X10*3/UL (ref 0.1–1)
MONOCYTES NFR BLD AUTO: 7.1 %
NEUTROPHILS # BLD AUTO: 3.2 X10*3/UL (ref 1.2–7.7)
NEUTROPHILS NFR BLD AUTO: 56.9 %
NON HDL CHOLESTEROL: 162 MG/DL (ref 0–149)
NRBC BLD-RTO: 0 /100 WBCS (ref 0–0)
PLATELET # BLD AUTO: 213 X10*3/UL (ref 150–450)
POTASSIUM SERPL-SCNC: 3.8 MMOL/L (ref 3.5–5.3)
PROT SERPL-MCNC: 6.7 G/DL (ref 6.4–8.2)
PSA SERPL-MCNC: 1.23 NG/ML
RBC # BLD AUTO: 4.54 X10*6/UL (ref 4.5–5.9)
SODIUM SERPL-SCNC: 139 MMOL/L (ref 136–145)
TRIGL SERPL-MCNC: 49 MG/DL (ref 0–149)
VLDL: 10 MG/DL (ref 0–40)
WBC # BLD AUTO: 5.6 X10*3/UL (ref 4.4–11.3)

## 2024-07-29 ENCOUNTER — TELEPHONE (OUTPATIENT)
Dept: PRIMARY CARE | Facility: CLINIC | Age: 64
End: 2024-07-29
Payer: COMMERCIAL

## 2024-07-29 DIAGNOSIS — E78.00 PURE HYPERCHOLESTEROLEMIA: Primary | ICD-10-CM

## 2024-07-29 NOTE — TELEPHONE ENCOUNTER
Result Communication  Results discussed with patient.  Labs okay except high LDL.  His uncle had heart disease in his 40s.  Will get a calcium score.  Risks and benefits discussed    Resulted Orders   Comprehensive Metabolic Panel   Result Value Ref Range    Glucose 80 74 - 99 mg/dL    Sodium 139 136 - 145 mmol/L    Potassium 3.8 3.5 - 5.3 mmol/L    Chloride 106 98 - 107 mmol/L    Bicarbonate 24 21 - 32 mmol/L    Anion Gap 13 10 - 20 mmol/L    Urea Nitrogen 17 6 - 23 mg/dL    Creatinine 1.00 0.50 - 1.30 mg/dL    eGFR 84 >60 mL/min/1.73m*2      Comment:      Calculations of estimated GFR are performed using the 2021 CKD-EPI Study Refit equation without the race variable for the IDMS-Traceable creatinine methods.  https://jasn.asnjournals.org/content/early/2021/09/22/ASN.9332586294    Calcium 9.5 8.6 - 10.6 mg/dL    Albumin 4.5 3.4 - 5.0 g/dL    Alkaline Phosphatase 52 33 - 136 U/L    Total Protein 6.7 6.4 - 8.2 g/dL    AST 14 9 - 39 U/L    Bilirubin, Total 1.5 (H) 0.0 - 1.2 mg/dL    ALT 11 10 - 52 U/L      Comment:      Patients treated with Sulfasalazine may generate falsely decreased results for ALT.   Lipid Panel   Result Value Ref Range    Cholesterol 214 (H) 0 - 199 mg/dL      Comment:            Age      Desirable   Borderline High   High     0-19 Y     0 - 169       170 - 199     >/= 200    20-24 Y     0 - 189       190 - 224     >/= 225         >24 Y     0 - 199       200 - 239     >/= 240   **All ranges are based on fasting samples. Specific   therapeutic targets will vary based on patient-specific   cardiac risk.    Pediatric guidelines reference:Pediatrics 2011, 128(S5).Adult guidelines reference: NCEP ATPIII Guidelines,CADENCE 2001, 258:2486-97    Venipuncture immediately after or during the administration of Metamizole may lead to falsely low results. Testing should be performed immediately prior to Metamizole dosing.    HDL-Cholesterol 52.2 mg/dL      Comment:        Age       Very Low   Low     Normal     High    0-19 Y    < 35      < 40     40-45     ----  20-24 Y    ----     < 40      >45      ----        >24 Y      ----     < 40     40-60      >60      Cholesterol/HDL Ratio 4.1       Comment:        Ref Values  Desirable  < 3.4  High Risk  > 5.0    LDL Calculated 152 (H) <=99 mg/dL      Comment:                                  Near   Borderline      AGE      Desirable  Optimal    High     High     Very High     0-19 Y     0 - 109     ---    110-129   >/= 130     ----    20-24 Y     0 - 119     ---    120-159   >/= 160     ----      >24 Y     0 -  99   100-129  130-159   160-189     >/=190      VLDL 10 0 - 40 mg/dL    Triglycerides 49 0 - 149 mg/dL      Comment:         Age         Desirable   Borderline High   High     Very High   0 D-90 D    19 - 174         ----         ----        ----  91 D- 9 Y     0 -  74        75 -  99     >/= 100      ----    10-19 Y     0 -  89        90 - 129     >/= 130      ----    20-24 Y     0 - 114       115 - 149     >/= 150      ----         >24 Y     0 - 149       150 - 199    200- 499    >/= 500    Venipuncture immediately after or during the administration of Metamizole may lead to falsely low results. Testing should be performed immediately prior to Metamizole dosing.    Non HDL Cholesterol 162 (H) 0 - 149 mg/dL      Comment:            Age       Desirable   Borderline High   High     Very High     0-19 Y     0 - 119       120 - 144     >/= 145    >/= 160    20-24 Y     0 - 149       150 - 189     >/= 190      ----         >24 Y    30 mg/dL above LDL Cholesterol goal     CBC and Auto Differential   Result Value Ref Range    WBC 5.6 4.4 - 11.3 x10*3/uL    nRBC 0.0 0.0 - 0.0 /100 WBCs    RBC 4.54 4.50 - 5.90 x10*6/uL    Hemoglobin 13.9 13.5 - 17.5 g/dL    Hematocrit 40.9 (L) 41.0 - 52.0 %    MCV 90 80 - 100 fL    MCH 30.6 26.0 - 34.0 pg    MCHC 34.0 32.0 - 36.0 g/dL    RDW 12.3 11.5 - 14.5 %    Platelets 213 150 - 450 x10*3/uL    Neutrophils % 56.9 40.0 - 80.0 %    Immature  Granulocytes %, Automated 0.2 0.0 - 0.9 %      Comment:      Immature Granulocyte Count (IG) includes promyelocytes, myelocytes and metamyelocytes but does not include bands. Percent differential counts (%) should be interpreted in the context of the absolute cell counts (cells/UL).    Lymphocytes % 32.4 13.0 - 44.0 %    Monocytes % 7.1 2.0 - 10.0 %    Eosinophils % 2.5 0.0 - 6.0 %    Basophils % 0.9 0.0 - 2.0 %    Neutrophils Absolute 3.20 1.20 - 7.70 x10*3/uL      Comment:      Percent differential counts (%) should be interpreted in the context of the absolute cell counts (cells/uL).    Immature Granulocytes Absolute, Automated 0.01 0.00 - 0.70 x10*3/uL    Lymphocytes Absolute 1.82 1.20 - 4.80 x10*3/uL    Monocytes Absolute 0.40 0.10 - 1.00 x10*3/uL    Eosinophils Absolute 0.14 0.00 - 0.70 x10*3/uL    Basophils Absolute 0.05 0.00 - 0.10 x10*3/uL   Prostate Specific Antigen   Result Value Ref Range    Prostate Specific AG 1.23 <=4.00 ng/mL    Narrative    The FDA requires that the method used for PSA assay be reported to the physician. Values obtained with different assay methods must not be used interchangeably. This test was performed at [facility] using [employer] PSA method, which is a sandwich immunoassay using chemiluminescence for quantitation. The assay is approved for measurement of prostate-specific antigen (PSA) in serum and may be used in conjunction with a digital rectal examination in men 50 years and older as an aid in the detection of prostate cancer. 5 Alpha-reductase inhibitors (e.g., Proscar, Finasteride, Avodart, Dutasteride, and Phyllis) for the treatment of BPH have been shown to lower PSA levels by an average of 50% after 6 months of treatment.            1:00 PM      Results were successfully communicated with the patient and they acknowledged their understanding.

## 2024-10-03 PROBLEM — R63.4 ABNORMAL WEIGHT LOSS: Status: ACTIVE | Noted: 2024-10-03

## 2024-10-03 PROBLEM — K62.5 RECTAL HEMORRHAGE: Status: ACTIVE | Noted: 2024-10-03

## 2024-10-03 PROBLEM — F32.A DEPRESSIVE DISORDER: Status: ACTIVE | Noted: 2024-10-03

## 2024-10-22 ENCOUNTER — APPOINTMENT (OUTPATIENT)
Dept: DERMATOLOGY | Facility: CLINIC | Age: 64
End: 2024-10-22
Payer: COMMERCIAL

## 2024-10-25 ENCOUNTER — APPOINTMENT (OUTPATIENT)
Dept: CARDIOLOGY | Facility: CLINIC | Age: 64
End: 2024-10-25
Payer: COMMERCIAL

## 2024-10-25 VITALS
RESPIRATION RATE: 16 BRPM | WEIGHT: 180 LBS | OXYGEN SATURATION: 99 % | HEART RATE: 52 BPM | BODY MASS INDEX: 24.41 KG/M2 | DIASTOLIC BLOOD PRESSURE: 91 MMHG | SYSTOLIC BLOOD PRESSURE: 148 MMHG

## 2024-10-25 DIAGNOSIS — I48.0 PAROXYSMAL A-FIB (MULTI): ICD-10-CM

## 2024-10-25 DIAGNOSIS — I45.10 INCOMPLETE RIGHT BUNDLE BRANCH BLOCK (RBBB): ICD-10-CM

## 2024-10-25 PROBLEM — C85.90 LYMPHOMA: Status: RESOLVED | Noted: 2023-09-20 | Resolved: 2024-10-25

## 2024-10-25 PROCEDURE — 1159F MED LIST DOCD IN RCRD: CPT | Performed by: STUDENT IN AN ORGANIZED HEALTH CARE EDUCATION/TRAINING PROGRAM

## 2024-10-25 PROCEDURE — 93000 ELECTROCARDIOGRAM COMPLETE: CPT | Performed by: STUDENT IN AN ORGANIZED HEALTH CARE EDUCATION/TRAINING PROGRAM

## 2024-10-25 PROCEDURE — 1160F RVW MEDS BY RX/DR IN RCRD: CPT | Performed by: STUDENT IN AN ORGANIZED HEALTH CARE EDUCATION/TRAINING PROGRAM

## 2024-10-25 PROCEDURE — 99213 OFFICE O/P EST LOW 20 MIN: CPT | Performed by: STUDENT IN AN ORGANIZED HEALTH CARE EDUCATION/TRAINING PROGRAM

## 2024-10-25 ASSESSMENT — ENCOUNTER SYMPTOMS
PSYCHIATRIC NEGATIVE: 1
RESPIRATORY NEGATIVE: 1
GASTROINTESTINAL NEGATIVE: 1
ENDOCRINE NEGATIVE: 1
HEMATOLOGIC/LYMPHATIC NEGATIVE: 1
CONSTITUTIONAL NEGATIVE: 1
EYES NEGATIVE: 1
NEUROLOGICAL NEGATIVE: 1
MUSCULOSKELETAL NEGATIVE: 1
CARDIOVASCULAR NEGATIVE: 1

## 2024-10-25 NOTE — PATIENT INSTRUCTIONS
Thank you for your visit today. Please contact our office (via MyChart or phone) with any additional questions.     [facility] [place]    Marilu, RN/Clinic Nurse for:    Dr. Deidra Peck    [address], [address]  [place] [address]    Phone: [phone] Press Option 5 then Option 3 to speak with the Clinic Nurse (Marilu)    _____    To Reach:    Billing Questions -    [phone]  Scheduling / Rescheduling -  Option 1  Refills / Medication Requests -  Option 3  General Office / [place] -  Option 4  Results -     Option 6  Medical Records -    Option 7  Repeat Options -    Option 9

## 2024-10-25 NOTE — PROGRESS NOTES
[facility] Cardiology Outpatient Follow-up Visit     Reason for Visit: follow-up for paroxysmal afib.     HPI: Abhijit Hickman is a 63 y.o.  male who presents today for a follow-up for paroxysmal atrial fibrillation. Past medical history of remote paroxysmal afib (Dx March 2023; s/p HAYLEE guided DCCV 3/13/2023), B-cell lymphoma s/p radiation, cervical radiculopathy, and hx of thoracic outlet syndrome.      Previously admitted for syncope, found to have afib with RVR. Patient noted sudden fatigue, dyspnea, and tachycardia the day prior to admission. Per his HR monitor, his HR was in 120s when his typical resting HR was in ~60 bpm. Patient remains very active with skiing and biking. He went to the bathroom and felt like he was going ot pass out so he laid down on floor. He ended up losing consciousness and later woke up on the floor. He denied any trauma, chest pain.      In the ED, ECG showed atrial fibrillation Chest XR showed no evidence of acute cardiopulmonary disease Placed on heparin ggt and initiated on metoprolol for rate control. Cardiology consulted for new diagnosis of afib.      Abhijit was evaluated at bedside by cardiology consult service on 3/12/2023. He remained in afib. He notes feeling generalized fatigue, mild dyspnea when in afib. No chest pains. No prior cardiac history. Previously tolerating vigorous activity without significant symptoms. As patient remained in afib > recommended HAYLEE guided DCCV > s/p successful HAYLEE guided DCCV on 3/13/2023; d/c on rivaroxaban 20 mg daily (for minimum of 4 weeks), metoprolol.      Patient presented to cardiology clinic on 4/7/2023. Abhijit feels at his baseline state of health, tolerating vigorous activity without significant symptoms. No further episodes of symptomatic afib per patient. No bleeding issues on rivaroxaban. Patient [id number] score is 0.     Patient presented to cardiology clinic on 10/27/2023. Recent hernia surgery; tolerated well. Heart  rate well controlled; no recent episodes of atrial fibrillation. Tolerating physical activity without active cardiac complaints. No recent syncope.     Abhijit returned to cardiology clinic on 10/25/2024.  Patient asymptomatic from a cardiovascular standpoint and tolerating regular physical activity without cardiovascular complaints.  Biked 900 miles over the summer.  No recent syncope.  No recurrence of symptomatic atrial fibrillation.  Per patient home blood pressure is well-controlled.  Will continue cardiac management as below and follow-up in 1 year or earlier if needed.     PMHx: As above  PSx: Appendectomy, oral surgery  FMHx: Mother T2DM, father T2DM and multiple myeloma  Social Hx: Never smoker, denies EtOH, denies illicit drug     ROS: 10 point ROS reviewed and otherwise negative except what is stated in HPI.     Prior CV testing:      ECG (10/25/2024)- normal sinus rhythm    HAYLEE (3/13/2023)- no LA or ALTA thrombus; s/p successful DCCV from afib with RVR to sinus rhythm       Review of Systems:  Review of Systems   Constitutional: Negative.   HENT: Negative.     Eyes: Negative.    Cardiovascular: Negative.    Respiratory: Negative.     Endocrine: Negative.    Hematologic/Lymphatic: Negative.    Skin: Negative.    Musculoskeletal: Negative.    Gastrointestinal: Negative.    Genitourinary: Negative.    Neurological: Negative.    Psychiatric/Behavioral: Negative.         Outpatient Medications:    Current Outpatient Medications:     alpha lipoic acid 600 mg capsule, Take by mouth., Disp: , Rfl:     cholecalciferol (Vitamin D3) 50 mcg (2,000 unit) capsule, Take 1 capsule (50 mcg) by mouth., Disp: , Rfl:     coenzyme Q-10 (Co Q-10) 200 mg capsule, Take 1 capsule (200 mg) by mouth 3 times a day., Disp: , Rfl:     gabapentin (Neurontin) 300 mg capsule, Take 1 capsule (300 mg) by mouth 3 times a day. Do not start before April 15, 2024., Disp: 270 capsule, Rfl: 0    nitroglycerin (Nitrostat) 0.4 mg SL tablet, DISSOLVE  1 TABLET UNDER THE TONGUE AS NEEDED FOR CHEST PAIN., Disp: 90 tablet, Rfl: 0    vitamin B complex vit C no.4 (SUPER B COMPLEX + C ORAL), Take 1 capsule by mouth 2 times a day., Disp: , Rfl:     ibuprofen 600 mg tablet, Take 1 tablet (600 mg) by mouth every 6 hours if needed for moderate pain (4 - 6) for up to 20 doses., Disp: 20 tablet, Rfl: 0    melatonin 10 mg capsule, Take 1 capsule (10 mg) by mouth once daily at bedtime. (Patient not taking: Reported on 10/25/2024), Disp: , Rfl:      Last Recorded Vitals  BP (!) 148/91   Pulse 52   Resp 16   Wt 81.6 kg (180 lb)   SpO2 99%   BMI 24.41 kg/m²     Physical Exam:    Physical Exam  Constitutional:       General: He is not in acute distress.  HENT:      Head: Normocephalic.      Mouth/Throat:      Mouth: Mucous membranes are moist.   Eyes:      Extraocular Movements: Extraocular movements intact.      Conjunctiva/sclera: Conjunctivae normal.   Neck:      Vascular: No carotid bruit.   Cardiovascular:      Rate and Rhythm: Normal rate and regular rhythm.      Heart sounds: No murmur heard.  Pulmonary:      Effort: Pulmonary effort is normal. No respiratory distress.      Breath sounds: Normal breath sounds.   Abdominal:      General: There is no distension.      Palpations: Abdomen is soft.   Musculoskeletal:      Right lower leg: No edema.      Left lower leg: No edema.   Skin:     General: Skin is warm and dry.   Neurological:      General: No focal deficit present.      Mental Status: He is alert.      Cranial Nerves: No cranial nerve deficit.      Motor: No weakness.   Psychiatric:         Mood and Affect: Mood normal.         Behavior: Behavior normal.         Lab/Radiology/Diagnostic Review:    Labs    Lab Results   Component Value Date    GLUCOSE 80 07/26/2024    CALCIUM 9.5 07/26/2024     07/26/2024    K 3.8 07/26/2024    CO2 24 07/26/2024     07/26/2024    BUN 17 07/26/2024    CREATININE 1.00 07/26/2024       Lab Results   Component Value Date  "   WBC 5.6 07/26/2024    HGB 13.9 07/26/2024    HCT 40.9 (L) 07/26/2024    MCV 90 07/26/2024     07/26/2024       Lab Results   Component Value Date    CHOL 214 (H) 07/26/2024    CHOL 159 08/25/2023    CHOL 181 08/26/2022     Lab Results   Component Value Date    HDL 52.2 07/26/2024    HDL 52.9 08/25/2023    HDL 48.9 08/26/2022     Lab Results   Component Value Date    LDLCALC 152 (H) 07/26/2024     Lab Results   Component Value Date    TRIG 49 07/26/2024    TRIG 48 08/25/2023    TRIG 52 08/26/2022     No components found for: \"CHOLHDL\"    Lab Results   Component Value Date    BNP 56 03/11/2023       Lab Results   Component Value Date    TSH 2.25 08/25/2023       Assessment:   62 y.o.  male who presents today for a follow-up for paroxysmal atrial fibrillation. Past medical history of remote paroxysmal afib (Dx March 2023; s/p HAYLEE guided DCCV 3/13/2023), B-cell lymphoma s/p radiation, cervical radiculopathy, and hx of thoracic outlet syndrome.     Abhijit returned to cardiology clinic on 10/25/2024.  Patient asymptomatic from a cardiovascular standpoint and tolerating regular physical activity without cardiovascular complaints.  Biked 900 miles over the summer.  No recent syncope.  No recurrence of symptomatic atrial fibrillation.  Per patient home blood pressure is well-controlled.  Will continue cardiac management as below and follow-up in 1 year or earlier if needed.    Overall Plan:  1) Paroxysmal afib ([id number])- s/p HAYLEE guided DCCV 3/13/2023 > completed ~ 4 weeks total of systemic anti-coagulation with rivaroxaban > given [id number] long-term anti-coagulation deferred; no recent recurrence of atrial fibrillation (10/25/2024), monitor clinically      2) Hx of Syncope- no further episodes of syncope; likely exacerbated by prior afib with RVR     3) Hx prior elevated troponin- type II MI (supply/demand mismatch) in setting of afib with RVR; ACS was not suspected; patient tolerating vigorous " physical activity (skiing, biking) without symptoms.     Disposition- return to cardiology clinic in ~ 1 year    Thank you for your visit today. Please contact our office with any questions.     Pancho Gay MD

## 2024-11-01 ENCOUNTER — APPOINTMENT (OUTPATIENT)
Dept: CARDIOLOGY | Facility: CLINIC | Age: 64
End: 2024-11-01
Payer: COMMERCIAL

## 2024-11-25 ENCOUNTER — HOSPITAL ENCOUNTER (OUTPATIENT)
Dept: RADIOLOGY | Facility: CLINIC | Age: 64
Discharge: HOME | End: 2024-11-25
Payer: COMMERCIAL

## 2024-11-25 DIAGNOSIS — E78.00 PURE HYPERCHOLESTEROLEMIA: ICD-10-CM

## 2024-11-25 PROCEDURE — 75571 CT HRT W/O DYE W/CA TEST: CPT

## 2024-11-26 NOTE — RESULT ENCOUNTER NOTE
Calcium score came back low.  Has incidental finding of small lung nodules and liver cysts.  Please call me tomorrow to discuss further plan

## 2024-11-27 ENCOUNTER — TELEPHONE (OUTPATIENT)
Dept: PRIMARY CARE | Facility: CLINIC | Age: 64
End: 2024-11-27
Payer: COMMERCIAL

## 2024-11-27 DIAGNOSIS — R91.1 LUNG NODULE: ICD-10-CM

## 2024-11-27 DIAGNOSIS — K76.9 LESION OF LIVER: Primary | ICD-10-CM

## 2024-11-27 NOTE — TELEPHONE ENCOUNTER
Result Communication  Discussed results.  Calcium score is low  Has liver lesions which are likely cysts.  Will get an ultrasound to confirm  Will refer to pulmonary nodule clinic.  For further follow-up.      Resulted Orders   CT cardiac scoring wo IV contrast    Narrative    Interpreted By:  Surekha Campbell,   STUDY:  CT CARDIAC SCORING WO IV CONTRAST;  11/25/2024 8:41 am      INDICATION:  Signs/Symptoms:risk assessment.      ,E78.00 Pure hypercholesterolemia, unspecified      COMPARISON:  None.      ACCESSION NUMBER(S):  [id number]      ORDERING CLINICIAN:  GILLIAN BUNN      TECHNIQUE:  Using prospective ECG gating, CT scan of the coronary arteries was  performed without intravenous contrast. Coronary calcium scoring  was  performed according to the method of Agatston.      FINDINGS:  The score and distribution of calcium in the coronary arteries is as  follows:      LM 2.78  LAD 4.77  LCx 0.2  RCA 0.6      Total 8.35      The visualized mid/lower ascending thoracic aorta measures 3.3 cm in  diameter.  The heart is normal in size.  No significant pericardial  effusion.      No gross mediastinal or hilar lymphadenopathy in the included areas.  No airspace consolidation or pleural effusion in the included areas.5  mm right lung nodule image 35/75. 2 mm right lung nodule image 31/75.      2.2 x 1.6 cm hypodensity in the central aspect of the left lobe of  the liver. 1.4 cm hypodensity in the anterior right hepatic lobe  image 66/75.        Impression    1. Coronary artery calcium score of 8.35*.  2. 2 right lung nodules measuring up to 5 mm. See below.  3. 2 low-density liver lesions measuring up to 2.2 cm. Consider  further evaluation with ultrasound.      *Coronary artery calcium scoring may be helpful in predicting the  risk for future coronary heart disease events.  According to the  American College of Cardiology Foundation Clinical Expert Consensus  Task Force, such testing provides important prognostic  "information in  patients with more than one coronary heart disease risk factor. The  coronary artery calcium score correlates with the annual risk of a  non-fatal myocardial infarction or coronary heart disease death.      Coronary artery score            Annual Risk      0-99                             0.4%  100-399                        1.3%  >400                            2.4%      These three \"breakpoints\" correspond to lower, intermediate and high  risk states for future coronary events.  Such information should be  used, along with appropriate clinical judgment, to make decisions  regarding the intensity of risk factor management strategies to treat  blood lipids and to modify other non-lipid coronary risk factors.      Reference: [place] P et al. Circulation.  2007; 115:402-426.      MACRO:  Incidental Finding:  A non-calcified pulmonary nodule/multiple  non-calcified pulmonary nodules measuring less than 6 mm, likely  benign.  (**-YCF-**)      Instructions:  No further follow-up is required, however, if the  patient has high risk factors for primary lung malignancy, follow-up  noncontrast CT scan chest in 12 months may be obtained. (Alexis Oliveira et al., Guidelines for management of incidental pulmonary  nodules detected on CT images: From the Fleischner Society 2017,  Radiology. 2017 Jul;284 (1):228-243.) [email or web address]      Signed by: Surekha Campbell 11/26/2024 1:17 PM  Dictation workstation:   [id number]       2:47 PM      Results were successfully communicated with the patient and they acknowledged their understanding.    "

## 2024-12-03 ENCOUNTER — HOSPITAL ENCOUNTER (OUTPATIENT)
Dept: RADIOLOGY | Facility: CLINIC | Age: 64
Discharge: HOME | End: 2024-12-03
Payer: COMMERCIAL

## 2024-12-03 DIAGNOSIS — K76.9 LESION OF LIVER: ICD-10-CM

## 2024-12-03 PROCEDURE — 76705 ECHO EXAM OF ABDOMEN: CPT

## 2024-12-03 PROCEDURE — 76705 ECHO EXAM OF ABDOMEN: CPT | Performed by: RADIOLOGY

## 2024-12-05 NOTE — RESULT ENCOUNTER NOTE
Ultrasound shows complex cyst on liver and also cyst on kidney.  Will do a follow-up in 6 months to make sure there is no change.

## 2024-12-12 ENCOUNTER — TELEMEDICINE (OUTPATIENT)
Dept: PRIMARY CARE | Facility: CLINIC | Age: 64
End: 2024-12-12
Payer: COMMERCIAL

## 2024-12-12 DIAGNOSIS — R91.1 LUNG NODULE: Primary | ICD-10-CM

## 2024-12-12 DIAGNOSIS — C85.10 B-CELL LYMPHOMA, UNSPECIFIED B-CELL LYMPHOMA TYPE, UNSPECIFIED BODY REGION (MULTI): ICD-10-CM

## 2024-12-12 PROCEDURE — 1159F MED LIST DOCD IN RCRD: CPT | Performed by: NURSE PRACTITIONER

## 2024-12-12 PROCEDURE — 1160F RVW MEDS BY RX/DR IN RCRD: CPT | Performed by: NURSE PRACTITIONER

## 2024-12-12 PROCEDURE — 1036F TOBACCO NON-USER: CPT | Performed by: NURSE PRACTITIONER

## 2024-12-12 PROCEDURE — 99202 OFFICE O/P NEW SF 15 MIN: CPT | Performed by: NURSE PRACTITIONER

## 2024-12-12 PROCEDURE — 1126F AMNT PAIN NOTED NONE PRSNT: CPT | Performed by: NURSE PRACTITIONER

## 2024-12-12 SDOH — ECONOMIC STABILITY: FOOD INSECURITY: WITHIN THE PAST 12 MONTHS, THE FOOD YOU BOUGHT JUST DIDN'T LAST AND YOU DIDN'T HAVE MONEY TO GET MORE.: NEVER TRUE

## 2024-12-12 SDOH — ECONOMIC STABILITY: FOOD INSECURITY: WITHIN THE PAST 12 MONTHS, YOU WORRIED THAT YOUR FOOD WOULD RUN OUT BEFORE YOU GOT MONEY TO BUY MORE.: NEVER TRUE

## 2024-12-12 ASSESSMENT — LIFESTYLE VARIABLES
SKIP TO QUESTIONS 9-10: 1
HOW OFTEN DO YOU HAVE SIX OR MORE DRINKS ON ONE OCCASION: NEVER
AUDIT-C TOTAL SCORE: 0
HOW OFTEN DO YOU HAVE A DRINK CONTAINING ALCOHOL: NEVER
HOW MANY STANDARD DRINKS CONTAINING ALCOHOL DO YOU HAVE ON A TYPICAL DAY: PATIENT DOES NOT DRINK

## 2024-12-12 ASSESSMENT — COLUMBIA-SUICIDE SEVERITY RATING SCALE - C-SSRS
2. HAVE YOU ACTUALLY HAD ANY THOUGHTS OF KILLING YOURSELF?: NO
6. HAVE YOU EVER DONE ANYTHING, STARTED TO DO ANYTHING, OR PREPARED TO DO ANYTHING TO END YOUR LIFE?: NO
1. IN THE PAST MONTH, HAVE YOU WISHED YOU WERE DEAD OR WISHED YOU COULD GO TO SLEEP AND NOT WAKE UP?: NO

## 2024-12-12 ASSESSMENT — ENCOUNTER SYMPTOMS
LOSS OF SENSATION IN FEET: 0
OCCASIONAL FEELINGS OF UNSTEADINESS: 0
DEPRESSION: 1

## 2024-12-12 ASSESSMENT — PAIN SCALES - GENERAL: PAINLEVEL_OUTOF10: 0-NO PAIN

## 2024-12-12 NOTE — PATIENT INSTRUCTIONS
Pharmacist Admission Medication Reconciliation Pending Note    Prior to Admission Medications were reviewed by the pharmacist and pended for provider review during admission medication reconciliation.    Medications were pended by the pharmacist at this time as follows:    Pended Admission Order Reconciliation Actions     Order Name Action Reordered As    acetaminophen (TYLENOL) 500 MG tablet Do Not Order for Admission     albuterol-ipratropium 2.5 mg/0.5 mg (DUONEB) 0.5-2.5 (3) MG/3ML nebulizer solution Order for Admission albuterol-ipratropium 2.5 mg/0.5 mg (DUONEB) nebulizer solution 3 mL    baclofen (LIORESAL) 10 MG tablet Order for Admission baclofen (LIORESAL) tablet 10 mg    bisacodyl (DULCOLAX) 10 MG suppository Do Not Order for Admission     esomeprazole (NEXIUM) 40 MG packet Order for Admission esomeprazole (NexIUM) packet 40 mg    aspirin 81 MG chewable tablet Order for Admission aspirin chewable 81 mg    CARBOXYMethylcellulose (REFRESH PLUS) 0.5 % Solution Do Not Order for Admission     senna (SENOKOT) 8.6 MG tablet Order for Admission senna (SENOKOT) 17.2 mg    guaifenesin 100 MG/5ML Do Not Order for Admission     ALPRAZolam (XANAX) 0.25 MG tablet Order for Admission ALPRAZolam (XANAX) tablet 0.25 mg    sertraline (ZOLOFT) 100 MG tablet Order for Admission sertraline (ZOLOFT) tablet 100 mg    atorvastatin (LIPITOR) 20 MG tablet Order for Admission atorvastatin (LIPITOR) tablet 20 mg    levETIRAcetam (KEPPRA) 100 MG/ML solution Order for Admission levETIRAcetam (KepPRA) 100 MG/ML solution 80 mg            Orders Pended To Continue For Hospital Stay     ID Description Pended By When Reason    [id number] albuterol-ipratropium 2.5 mg/0.5 mg (DUONEB) nebulizer solution 3 mL-4 TIMES DAILY RESPIRATORY PRN Renay Rangel, [facility] 03/28/17 2123     [id number] [facility]     [id number] ALPRAZolam (XANAX) tablet 0.25 mg-EVERY 8 HOURS PRN Renay Rangel, [facility]  Pulmonary nodules measuring up to 5 mm right lung nodule   History of lymphoma  Recommend CT Chest 3-6 months.    He will be notified of results as they become available.         Lung Nodule Clinic    [facility] [address]  [place], [place] [address]  Phone [phone]  Fax [phone]  Nurse Coordinator [phone]                                          Welcome to the [facility]od[facility]    Today was the initial consult with the lung nodule clinic to determine proper recommendations for follow up. Your care is coordinated to ensure timely management.  As you know, early detection of cancer is very important.  Nodules that are large, look suspicious or have changed over time is why further evaluation such as the additional imaging test that we have ordered is needed. Our clinic will work closely with you in choosing the best next step.       What is my next step?  We will assist with scheduling scans, results reviews, and referrals for priority appointments.      Who do I call?  Your care coordinator for the lung nodule clinic can be contacted at [phone]  All scheduling needs can be assisted within the Cardiac Surgery/Thoracic Surgery/Lung Nodule Clinic offices at [phone].            Table  Tee H, Isaac DP, Leon MORALES, et al. Guidelines for Management of Incidental Pulmonary Nodules Detected on CT Images: From the Fleischner Society 2017. Radiology 2017;284:228-243.       03/28/17 Counts include 234 beds at the [facility]     [id number] atorvastatin (LIPITOR) tablet 20 mg-DAILY Renay Rangel, [facility] 03/28/17 2123     [id number] baclofen (LIORESAL) tablet 10 mg-EVERY EVENING Renaypearl Rangel, [facility] 03/28/17 2123     [id number] esomeprazole (NexIUM) packet 40 mg-EVERY 12 HOURS [facility]EDUL[facility] 2123     [id number] levETIRAcetam (KepPRA) 100 MG/ML solution 80 mg-2 TIMES DAILY [facility]17 2123     [id number] sertraline (ZOLOFT) tablet 100 mg-DAILY [facility]17 2123     [id number] senna (SENOKOT) 17.2 mg-NIGHTLY [facility] 2123             Pharmacist Notations:     3/28/2017 [facility] to MD  - Did not continue miscellaneous OTC/prn medications that are non-essential for inpatient stay. Consider ordering MISC IP PRNs Standard [[id number]] for analgesics, cathartics, antacids, etc.  - given septic shock, BP medications unaddressed  - MD to addressed DVTp    Last edited by Renay Rangel RPH on 03/28/17 at 2123          Orders that are ultimately reconciled and signed during admission medication reconciliation may differ from the pended actions above.    Please contact the pharmacist for questions.    Renay Rangel RPH  3/28/2017 9:23 PM

## 2024-12-12 NOTE — PROGRESS NOTES
Subjective   Patient ID: Abhijit Hickman is a 64 y.o. male who presents for New Patient Visit (New visit regarding lung nodule. Never used tobacco products. Abhijit has a history of Lymphoma.  Father had multiple myeloma. Paternal grandmother had colon cancer. ).  HPI 64-year-old male presents today for lung nodule clinic.    Virtual or Telephone Consent  A telephone visit (audio only) between the patient (at the originating site) and the provider (at the distant site) was utilized to provide this telehealth service.   Verbal consent was requested and obtained from Abhijit Hickman on this date, 12/12/24 for a telehealth visit.  Verbal consent was requested and obtained from Abhijit Hickman on this date, 12/12/24 for a telehealth visit.      Never used tobacco products. Abhijit has a history of Lymphoma.  Father had multiple myeloma. Paternal grandmother had colon cancer.    CT cardiac score dated 11/25/2024  .5 mm right lung nodule image 35/75. 2 mm right lung nodule image 31/75    Review of Systems  Review of systems: Present-feeling well. Not present-chills, fatigue and fever.  Respiratory: Not present-difficulty breathing, cough, bloody sputum.  Cardiovascular: Not present-chest pain, palpitations, dyspnea on exertion.  Objective   There were no vitals taken for this visit.     Physical Exam  Gen.: Mental status-alert. Gen. appearance-cooperative, well groomed and consistent with stated age. Not in acute distress or sickly. Orientation-oriented to time, place, purpose and person. Build and nutrition-well-nourished and well-developed. Hydration-well-hydrated.  Assessment/Plan   Diagnoses and all orders for this visit:  Lung nodule  -     Referral to Lung Nodule Center  -     CT chest wo IV contrast; Future  B-cell lymphoma, unspecified B-cell lymphoma type, unspecified body region (Multi)  -     CT chest wo IV contrast; Future

## 2024-12-23 DIAGNOSIS — I73.00 RAYNAUD'S PHENOMENON WITHOUT GANGRENE: ICD-10-CM

## 2024-12-23 RX ORDER — GABAPENTIN 300 MG/1
300 CAPSULE ORAL 3 TIMES DAILY
Qty: 270 CAPSULE | Refills: 3 | Status: SHIPPED | OUTPATIENT
Start: 2024-12-23 | End: 2025-12-18

## 2024-12-31 ENCOUNTER — OFFICE VISIT (OUTPATIENT)
Dept: PAIN MEDICINE | Facility: CLINIC | Age: 64
End: 2024-12-31
Payer: COMMERCIAL

## 2024-12-31 VITALS
BODY MASS INDEX: 23.03 KG/M2 | RESPIRATION RATE: 16 BRPM | HEART RATE: 64 BPM | TEMPERATURE: 97.9 F | SYSTOLIC BLOOD PRESSURE: 141 MMHG | HEIGHT: 72 IN | OXYGEN SATURATION: 99 % | WEIGHT: 170 LBS | DIASTOLIC BLOOD PRESSURE: 81 MMHG

## 2024-12-31 DIAGNOSIS — I73.00 RAYNAUD'S PHENOMENON WITHOUT GANGRENE: ICD-10-CM

## 2024-12-31 PROCEDURE — 1159F MED LIST DOCD IN RCRD: CPT | Performed by: NURSE PRACTITIONER

## 2024-12-31 PROCEDURE — 99214 OFFICE O/P EST MOD 30 MIN: CPT | Performed by: NURSE PRACTITIONER

## 2024-12-31 PROCEDURE — 1036F TOBACCO NON-USER: CPT | Performed by: NURSE PRACTITIONER

## 2024-12-31 PROCEDURE — 1126F AMNT PAIN NOTED NONE PRSNT: CPT | Performed by: NURSE PRACTITIONER

## 2024-12-31 PROCEDURE — 3008F BODY MASS INDEX DOCD: CPT | Performed by: NURSE PRACTITIONER

## 2024-12-31 RX ORDER — NITROGLYCERIN 0.4 MG/1
TABLET SUBLINGUAL
Qty: 100 EACH | Refills: 0 | Status: SHIPPED | OUTPATIENT
Start: 2024-12-31

## 2024-12-31 ASSESSMENT — ENCOUNTER SYMPTOMS
LOSS OF SENSATION IN FEET: 0
CONFUSION: 0
DIARRHEA: 0
CHEST TIGHTNESS: 0
NECK PAIN: 1
FEVER: 0
WHEEZING: 0
PALPITATIONS: 0
FREQUENCY: 0
DEPRESSION: 0
NUMBNESS: 0
OCCASIONAL FEELINGS OF UNSTEADINESS: 0
WEAKNESS: 0
CONSTIPATION: 0
CHILLS: 0
AGITATION: 0
NAUSEA: 0
DIZZINESS: 0
DIFFICULTY URINATING: 0
MYALGIAS: 1

## 2024-12-31 ASSESSMENT — PAIN - FUNCTIONAL ASSESSMENT: PAIN_FUNCTIONAL_ASSESSMENT: NO/DENIES PAIN

## 2024-12-31 ASSESSMENT — PAIN SCALES - GENERAL: PAINLEVEL_OUTOF10: 0-NO PAIN

## 2024-12-31 NOTE — PROGRESS NOTES
Chief Complain  Follow-up for chronic neck pain radiating to left upper extremity.    History Of Present Illness  Abhijit Hickman is a 64 y.o. male here for neck pain radiating to left upper extremity. The patient rates the pain at 0  on a scale from 0-10.  The patient describes pain as dull, aching, radiating.  The pain is worsened by lifting and pain worse at night when resting  and is alleviated by medications gabapentin, position change, and neuro supplements .  Since the last visit the pain has improved.    The patient denies any fever, chills, weight loss, weakness, bladder/ bowel incontinence, history of cancer, history of IV drug abuse, recent trauma.     Prior office visit:  12/6/23  Abhijit Hickman is a 63 y.o. 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  He is really doing well overall and pleased with his progress.  He continues to take neuro supplements and gabapentin 300 mg 3 times a day.  He denies medication side effects.  He continues to be very active with exercise and riding his bike and is a  and looking forward to the winter.  He does report using nitro to help control his left hand vasospasms related to Willem's.  He continues to struggle with divorce issues and problems getting things finalized.  He talks to a counselor 3 times a month which is getting positive feedback that he is doing well.  I encouraged him to continue with this treatment, and reassured him he is doing very well and has a lot to offer a .  Recommended joining groups and event activities where he might have the opportunity to meet someone.  Encouraged to continue with home stretching  exercises and increasing physical activity as tolerated.  Provided refill of gabapentin and nitroglycerin.  Patient verbalized understanding plan of care and is to follow-up in 6 months or sooner if needed.  I spent 20 minutes in the professional and overall care of this patient.    Portions of record reviewed for pertinent issues: active problem list, medication list, allergies, family history, social history, notes from last encounter, encounters, lab results, imaging and other available records.      I have personally reviewed the OARRS report for this patient. This report is scanned into the electronic medical record. I have considered the risks of abuse, dependence, addiction and diversion. It showed: Gabapentin by Dr. Agudelo and myself and oxycodone from Dr. Webb  Aberrant behavior: None    Past Medical History  He has a past medical history of Anemia, Depression, History of B-cell lymphoma, Other abnormalities of gait and mobility (07/12/2018), Other conditions influencing health status, Other enthesopathies, not elsewhere classified (07/12/2018), Paroxysmal A-fib (Multi), Personal history of other diseases of the digestive system (08/23/2021), Personal history of other specified conditions (09/27/2021), Raynaud disease, and Thoracic outlet syndrome.    Surgical History  He has a past surgical history that includes Appendectomy (05/22/2018); Other surgical history (05/17/2022); Cardioversion; and Hernia repair (Bilateral, 10/13/2023).     Social History  He reports that he has never smoked. He has never been exposed to tobacco smoke. He has never used smokeless tobacco. He reports that he does not drink alcohol and does not use drugs.    Family History  Family History   Problem Relation Name Age of Onset    Diabetes Mother      Stroke Mother      Hypertension Father      Multiple myeloma Father      Diabetes Brother      Colon cancer Paternal Grandmother          Allergies  Patient has no known  allergies.    Review of Systems  Review of Systems   Constitutional:  Negative for chills and fever.   Respiratory:  Negative for chest tightness and wheezing.    Cardiovascular:  Negative for chest pain and palpitations.   Gastrointestinal:  Negative for constipation, diarrhea and nausea.   Genitourinary:  Negative for difficulty urinating, frequency and urgency.   Musculoskeletal:  Positive for myalgias and neck pain.        Neck pain radiates to left upper extremity. On neuro supplements and gabapentin   Neurological:  Negative for dizziness, weakness and numbness.   Psychiatric/Behavioral:  Negative for agitation, confusion and suicidal ideas.         Physical Exam  Physical Exam  Constitutional:       General: He is not in acute distress.     Appearance: Normal appearance.   HENT:      Head: Normocephalic.   Musculoskeletal:      Cervical back: Neck supple. Tenderness present. Pain with movement and muscular tenderness present. Decreased range of motion.   Neurological:      General: No focal deficit present.      Mental Status: He is alert and oriented to person, place, and time.      GCS: GCS eye subscore is 4. GCS verbal subscore is 5. GCS motor subscore is 6.      Cranial Nerves: Cranial nerves 2-12 are intact.      Sensory: Sensation is intact.      Motor: Motor function is intact.      Coordination: Coordination is intact.      Gait: Gait is intact.   Psychiatric:         Attention and Perception: Attention and perception normal.         Mood and Affect: Mood normal.         Speech: Speech normal.         Behavior: Behavior normal.         Thought Content: Thought content normal.         Judgment: Judgment normal.           Last Recorded Vitals  Blood pressure 141/81, pulse 64, temperature 36.6 °C (97.9 °F), resp. rate 16, height 1.829 m (6'), weight 77.1 kg (170 lb), SpO2 99%.      Reviewed Labs  Lab Results   Component Value Date    GLUCOSE 80 07/26/2024    CALCIUM 9.5 07/26/2024     07/26/2024     K 3.8 2024    CO2 24 2024     2024    BUN 17 2024    CREATININE 1.00 2024           Assessment/Plan     Abhijit Hickman is a very pleasant 64 y.o. 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  He reports occasional numbness and tingling in his left upper extremity that comes and goes.  He is on gabapentin 300 mg 3 times a day and the neuro supplements which is significantly improved his neuropathic pain.  He denies any new neurological or constitutional symptoms.  Overall he is feeling well denies any medication side effects.  Of note patient reports finding of liver lesions and pulmonary nodule which is being evaluated by lung nodule center.  Plan to continue with gabapentin 300 mg 3 times a day and neuro supplements.  All questions and concerns answered.  Plan to follow-up in 6 months or sooner if needed.      Plan  At least 50% of the visit was involved in the discussion of the options for treatment. We discussed exercises, medication, interventional therapies and surgery. Healthy life style is essential with patient hard work to achieve the wellness. In addition; discussion with the patient and/or family about any of the diagnostic results, impressions and/or recommended diagnostic studies, prognosis, risks and benefits of treatment options, instructions for treatment and/or follow-up, importance of compliance with chosen treatment options, risk-factor reduction, and patient/family education.         Continue self-directed physical therapy  Sublingual nitroglycerin  provided a refill  Plan to continue with gabapentin for cervical radiculopathy.  Healthy lifestyle and anti-inflammatory diet in addition to weight control discussed with the patient  Alternative chronic  pain therapies was discussed, encouraged and information was handed  Return to Clinic 6 months or sooner if needed     *Please note this report has been produced using speech recognition software and may contain errors related to that system including grammar, punctuation and spelling as well as words and phrases that may be inappropriate. If there are questions or concerns, please feel free to contact me to clarify.      I spent 37 minutes in the professional and overall care of this patient.       Evelio Iniguez, NADINE-CNP

## 2025-01-31 NOTE — TELEPHONE ENCOUNTER
Health Maintenance       COVID-19 Vaccine (1)  Never done    Lead Blood/Venous (View Topic Details)  Never done    Influenza Vaccine (1 of 2)  Never done           Following review of the above:  Patient is not proceeding with: COVID-19 and Influenza    Patient sick during today's visit.     Note: Refer to final orders and clinician documentation.       Discussed ECG results with the patient.  Some changes reported compared to March.  Has ectopic atrial rhythm.  Has T inversions.  Check with cardiology prior to proceeding with surgery

## 2025-02-28 ENCOUNTER — APPOINTMENT (OUTPATIENT)
Dept: CARDIOLOGY | Facility: CLINIC | Age: 65
End: 2025-02-28
Payer: COMMERCIAL

## 2025-03-12 ENCOUNTER — HOSPITAL ENCOUNTER (OUTPATIENT)
Dept: RADIOLOGY | Facility: CLINIC | Age: 65
Discharge: HOME | End: 2025-03-12
Payer: COMMERCIAL

## 2025-03-12 DIAGNOSIS — R91.1 LUNG NODULE: ICD-10-CM

## 2025-03-12 DIAGNOSIS — C85.10 B-CELL LYMPHOMA, UNSPECIFIED B-CELL LYMPHOMA TYPE, UNSPECIFIED BODY REGION (MULTI): ICD-10-CM

## 2025-03-12 PROCEDURE — 71250 CT THORAX DX C-: CPT

## 2025-03-13 ENCOUNTER — TELEPHONE (OUTPATIENT)
Dept: PRIMARY CARE | Facility: CLINIC | Age: 65
End: 2025-03-13
Payer: COMMERCIAL

## 2025-03-14 DIAGNOSIS — R91.8 LUNG NODULES: Primary | ICD-10-CM

## 2025-07-15 ENCOUNTER — HOSPITAL ENCOUNTER (OUTPATIENT)
Facility: HOSPITAL | Age: 65
Setting detail: OBSERVATION
Discharge: HOME | End: 2025-07-16
Attending: STUDENT IN AN ORGANIZED HEALTH CARE EDUCATION/TRAINING PROGRAM | Admitting: INTERNAL MEDICINE
Payer: COMMERCIAL

## 2025-07-15 ENCOUNTER — APPOINTMENT (OUTPATIENT)
Dept: RADIOLOGY | Facility: HOSPITAL | Age: 65
End: 2025-07-15
Payer: COMMERCIAL

## 2025-07-15 ENCOUNTER — APPOINTMENT (OUTPATIENT)
Dept: CARDIOLOGY | Facility: HOSPITAL | Age: 65
End: 2025-07-15
Payer: COMMERCIAL

## 2025-07-15 DIAGNOSIS — I48.0 PAROXYSMAL ATRIAL FIBRILLATION (MULTI): Primary | ICD-10-CM

## 2025-07-15 PROBLEM — I48.92 FLUTTER-FIBRILLATION (MULTI): Status: ACTIVE | Noted: 2025-07-15

## 2025-07-15 PROBLEM — I48.91 FLUTTER-FIBRILLATION (MULTI): Status: ACTIVE | Noted: 2025-07-15

## 2025-07-15 LAB
ALBUMIN SERPL BCP-MCNC: 4.3 G/DL (ref 3.4–5)
ALP SERPL-CCNC: 51 U/L (ref 33–136)
ALT SERPL W P-5'-P-CCNC: 9 U/L (ref 10–52)
ANION GAP SERPL CALC-SCNC: 11 MMOL/L (ref 10–20)
AST SERPL W P-5'-P-CCNC: 14 U/L (ref 9–39)
BASOPHILS # BLD AUTO: 0.04 X10*3/UL (ref 0–0.1)
BASOPHILS NFR BLD AUTO: 0.7 %
BILIRUB SERPL-MCNC: 0.9 MG/DL (ref 0–1.2)
BUN SERPL-MCNC: 13 MG/DL (ref 6–23)
CALCIUM SERPL-MCNC: 8.8 MG/DL (ref 8.6–10.3)
CARDIAC TROPONIN I PNL SERPL HS: 6 NG/L (ref 0–20)
CHLORIDE SERPL-SCNC: 108 MMOL/L (ref 98–107)
CO2 SERPL-SCNC: 26 MMOL/L (ref 21–32)
CREAT SERPL-MCNC: 0.99 MG/DL (ref 0.5–1.3)
EGFRCR SERPLBLD CKD-EPI 2021: 84 ML/MIN/1.73M*2
EOSINOPHIL # BLD AUTO: 0.19 X10*3/UL (ref 0–0.7)
EOSINOPHIL NFR BLD AUTO: 3.2 %
ERYTHROCYTE [DISTWIDTH] IN BLOOD BY AUTOMATED COUNT: 13 % (ref 11.5–14.5)
GLUCOSE SERPL-MCNC: 84 MG/DL (ref 74–99)
HCT VFR BLD AUTO: 46.9 % (ref 41–52)
HGB BLD-MCNC: 15.4 G/DL (ref 13.5–17.5)
IMM GRANULOCYTES # BLD AUTO: 0.01 X10*3/UL (ref 0–0.7)
IMM GRANULOCYTES NFR BLD AUTO: 0.2 % (ref 0–0.9)
INR PPP: 1.1 (ref 0.9–1.1)
LYMPHOCYTES # BLD AUTO: 1.62 X10*3/UL (ref 1.2–4.8)
LYMPHOCYTES NFR BLD AUTO: 27.5 %
MAGNESIUM SERPL-MCNC: 2.09 MG/DL (ref 1.6–2.4)
MCH RBC QN AUTO: 30.7 PG (ref 26–34)
MCHC RBC AUTO-ENTMCNC: 32.8 G/DL (ref 32–36)
MCV RBC AUTO: 93 FL (ref 80–100)
MONOCYTES # BLD AUTO: 0.47 X10*3/UL (ref 0.1–1)
MONOCYTES NFR BLD AUTO: 8 %
NEUTROPHILS # BLD AUTO: 3.57 X10*3/UL (ref 1.2–7.7)
NEUTROPHILS NFR BLD AUTO: 60.4 %
NRBC BLD-RTO: 0 /100 WBCS (ref 0–0)
PLATELET # BLD AUTO: 200 X10*3/UL (ref 150–450)
POTASSIUM SERPL-SCNC: 4 MMOL/L (ref 3.5–5.3)
PROT SERPL-MCNC: 6.8 G/DL (ref 6.4–8.2)
PROTHROMBIN TIME: 11.6 SECONDS (ref 9.8–12.4)
RBC # BLD AUTO: 5.02 X10*6/UL (ref 4.5–5.9)
SODIUM SERPL-SCNC: 141 MMOL/L (ref 136–145)
TSH SERPL-ACNC: 3.19 MIU/L (ref 0.44–3.98)
WBC # BLD AUTO: 5.9 X10*3/UL (ref 4.4–11.3)

## 2025-07-15 PROCEDURE — G0378 HOSPITAL OBSERVATION PER HR: HCPCS

## 2025-07-15 PROCEDURE — 99222 1ST HOSP IP/OBS MODERATE 55: CPT

## 2025-07-15 PROCEDURE — 84484 ASSAY OF TROPONIN QUANT: CPT

## 2025-07-15 PROCEDURE — 36415 COLL VENOUS BLD VENIPUNCTURE: CPT

## 2025-07-15 PROCEDURE — 83735 ASSAY OF MAGNESIUM: CPT

## 2025-07-15 PROCEDURE — 71046 X-RAY EXAM CHEST 2 VIEWS: CPT | Performed by: STUDENT IN AN ORGANIZED HEALTH CARE EDUCATION/TRAINING PROGRAM

## 2025-07-15 PROCEDURE — 80053 COMPREHEN METABOLIC PANEL: CPT

## 2025-07-15 PROCEDURE — 71046 X-RAY EXAM CHEST 2 VIEWS: CPT

## 2025-07-15 PROCEDURE — 99223 1ST HOSP IP/OBS HIGH 75: CPT | Performed by: INTERNAL MEDICINE

## 2025-07-15 PROCEDURE — 85610 PROTHROMBIN TIME: CPT | Performed by: STUDENT IN AN ORGANIZED HEALTH CARE EDUCATION/TRAINING PROGRAM

## 2025-07-15 PROCEDURE — 36415 COLL VENOUS BLD VENIPUNCTURE: CPT | Performed by: STUDENT IN AN ORGANIZED HEALTH CARE EDUCATION/TRAINING PROGRAM

## 2025-07-15 PROCEDURE — 2500000001 HC RX 250 WO HCPCS SELF ADMINISTERED DRUGS (ALT 637 FOR MEDICARE OP): Performed by: INTERNAL MEDICINE

## 2025-07-15 PROCEDURE — 96372 THER/PROPH/DIAG INJ SC/IM: CPT | Performed by: INTERNAL MEDICINE

## 2025-07-15 PROCEDURE — 2500000004 HC RX 250 GENERAL PHARMACY W/ HCPCS (ALT 636 FOR OP/ED): Performed by: INTERNAL MEDICINE

## 2025-07-15 PROCEDURE — 85025 COMPLETE CBC W/AUTO DIFF WBC: CPT

## 2025-07-15 PROCEDURE — 84443 ASSAY THYROID STIM HORMONE: CPT | Performed by: INTERNAL MEDICINE

## 2025-07-15 PROCEDURE — 93005 ELECTROCARDIOGRAM TRACING: CPT

## 2025-07-15 PROCEDURE — 2500000001 HC RX 250 WO HCPCS SELF ADMINISTERED DRUGS (ALT 637 FOR MEDICARE OP)

## 2025-07-15 PROCEDURE — 99285 EMERGENCY DEPT VISIT HI MDM: CPT | Mod: 25 | Performed by: STUDENT IN AN ORGANIZED HEALTH CARE EDUCATION/TRAINING PROGRAM

## 2025-07-15 RX ORDER — METOPROLOL TARTRATE 25 MG/1
25 TABLET, FILM COATED ORAL 2 TIMES DAILY
Status: DISCONTINUED | OUTPATIENT
Start: 2025-07-15 | End: 2025-07-16 | Stop reason: HOSPADM

## 2025-07-15 RX ORDER — ENOXAPARIN SODIUM 100 MG/ML
40 INJECTION SUBCUTANEOUS EVERY 24 HOURS
Status: DISCONTINUED | OUTPATIENT
Start: 2025-07-15 | End: 2025-07-15

## 2025-07-15 RX ORDER — GABAPENTIN 300 MG/1
300 CAPSULE ORAL 3 TIMES DAILY
Status: DISCONTINUED | OUTPATIENT
Start: 2025-07-15 | End: 2025-07-16 | Stop reason: HOSPADM

## 2025-07-15 RX ORDER — GUAIFENESIN 600 MG/1
600 TABLET, EXTENDED RELEASE ORAL EVERY 12 HOURS PRN
Status: DISCONTINUED | OUTPATIENT
Start: 2025-07-15 | End: 2025-07-16 | Stop reason: HOSPADM

## 2025-07-15 RX ORDER — TALC
3 POWDER (GRAM) TOPICAL NIGHTLY PRN
Status: DISCONTINUED | OUTPATIENT
Start: 2025-07-15 | End: 2025-07-16 | Stop reason: HOSPADM

## 2025-07-15 RX ADMIN — GABAPENTIN 300 MG: 300 CAPSULE ORAL at 14:55

## 2025-07-15 RX ADMIN — APIXABAN 5 MG: 5 TABLET, FILM COATED ORAL at 20:43

## 2025-07-15 RX ADMIN — METOPROLOL TARTRATE 25 MG: 25 TABLET, FILM COATED ORAL at 14:55

## 2025-07-15 RX ADMIN — METOPROLOL TARTRATE 25 MG: 25 TABLET, FILM COATED ORAL at 20:43

## 2025-07-15 RX ADMIN — GABAPENTIN 300 MG: 300 CAPSULE ORAL at 20:43

## 2025-07-15 RX ADMIN — ENOXAPARIN SODIUM 40 MG: 100 INJECTION SUBCUTANEOUS at 14:55

## 2025-07-15 SDOH — SOCIAL STABILITY: SOCIAL INSECURITY: ARE THERE ANY APPARENT SIGNS OF INJURIES/BEHAVIORS THAT COULD BE RELATED TO ABUSE/NEGLECT?: NO

## 2025-07-15 SDOH — SOCIAL STABILITY: SOCIAL INSECURITY: WITHIN THE LAST YEAR, HAVE YOU BEEN AFRAID OF YOUR PARTNER OR EX-PARTNER?: NO

## 2025-07-15 SDOH — SOCIAL STABILITY: SOCIAL INSECURITY: ABUSE: ADULT

## 2025-07-15 SDOH — SOCIAL STABILITY: SOCIAL INSECURITY: DO YOU FEEL ANYONE HAS EXPLOITED OR TAKEN ADVANTAGE OF YOU FINANCIALLY OR OF YOUR PERSONAL PROPERTY?: NO

## 2025-07-15 SDOH — SOCIAL STABILITY: SOCIAL INSECURITY
WITHIN THE LAST YEAR, HAVE YOU BEEN KICKED, HIT, SLAPPED, OR OTHERWISE PHYSICALLY HURT BY YOUR PARTNER OR EX-PARTNER?: NO

## 2025-07-15 SDOH — SOCIAL STABILITY: SOCIAL INSECURITY
WITHIN THE LAST YEAR, HAVE YOU BEEN RAPED OR FORCED TO HAVE ANY KIND OF SEXUAL ACTIVITY BY YOUR PARTNER OR EX-PARTNER?: NO

## 2025-07-15 SDOH — ECONOMIC STABILITY: INCOME INSECURITY: IN THE PAST 12 MONTHS HAS THE ELECTRIC, GAS, OIL, OR WATER COMPANY THREATENED TO SHUT OFF SERVICES IN YOUR HOME?: NO

## 2025-07-15 SDOH — SOCIAL STABILITY: SOCIAL INSECURITY: HAVE YOU HAD ANY THOUGHTS OF HARMING ANYONE ELSE?: NO

## 2025-07-15 SDOH — SOCIAL STABILITY: SOCIAL INSECURITY: DOES ANYONE TRY TO KEEP YOU FROM HAVING/CONTACTING OTHER FRIENDS OR DOING THINGS OUTSIDE YOUR HOME?: NO

## 2025-07-15 SDOH — SOCIAL STABILITY: SOCIAL INSECURITY: WERE YOU ABLE TO COMPLETE ALL THE BEHAVIORAL HEALTH SCREENINGS?: YES

## 2025-07-15 SDOH — SOCIAL STABILITY: SOCIAL INSECURITY: WITHIN THE LAST YEAR, HAVE YOU BEEN HUMILIATED OR EMOTIONALLY ABUSED IN OTHER WAYS BY YOUR PARTNER OR EX-PARTNER?: NO

## 2025-07-15 SDOH — SOCIAL STABILITY: SOCIAL INSECURITY: HAVE YOU HAD THOUGHTS OF HARMING ANYONE ELSE?: NO

## 2025-07-15 SDOH — SOCIAL STABILITY: SOCIAL INSECURITY: HAS ANYONE EVER THREATENED TO HURT YOUR FAMILY OR YOUR PETS?: NO

## 2025-07-15 SDOH — SOCIAL STABILITY: SOCIAL INSECURITY: ARE YOU OR HAVE YOU BEEN THREATENED OR ABUSED PHYSICALLY, EMOTIONALLY, OR SEXUALLY BY ANYONE?: NO

## 2025-07-15 SDOH — SOCIAL STABILITY: SOCIAL INSECURITY: DO YOU FEEL UNSAFE GOING BACK TO THE PLACE WHERE YOU ARE LIVING?: NO

## 2025-07-15 ASSESSMENT — ACTIVITIES OF DAILY LIVING (ADL)
HEARING - RIGHT EAR: FUNCTIONAL
FEEDING YOURSELF: INDEPENDENT
TOILETING: INDEPENDENT
WALKS IN HOME: INDEPENDENT
PATIENT'S MEMORY ADEQUATE TO SAFELY COMPLETE DAILY ACTIVITIES?: YES
GROOMING: INDEPENDENT
ADEQUATE_TO_COMPLETE_ADL: YES
HEARING - RIGHT EAR: FUNCTIONAL
LACK_OF_TRANSPORTATION: NO
TOILETING: INDEPENDENT
ADEQUATE_TO_COMPLETE_ADL: YES
FEEDING YOURSELF: INDEPENDENT
WALKS IN HOME: INDEPENDENT
JUDGMENT_ADEQUATE_SAFELY_COMPLETE_DAILY_ACTIVITIES: YES
PATIENT'S MEMORY ADEQUATE TO SAFELY COMPLETE DAILY ACTIVITIES?: YES
DRESSING YOURSELF: INDEPENDENT
HEARING - LEFT EAR: FUNCTIONAL
DRESSING YOURSELF: INDEPENDENT
HEARING - LEFT EAR: FUNCTIONAL
BATHING: INDEPENDENT
BATHING: INDEPENDENT
JUDGMENT_ADEQUATE_SAFELY_COMPLETE_DAILY_ACTIVITIES: YES
GROOMING: INDEPENDENT

## 2025-07-15 ASSESSMENT — COGNITIVE AND FUNCTIONAL STATUS - GENERAL
DAILY ACTIVITIY SCORE: 24
PATIENT BASELINE BEDBOUND: NO
DAILY ACTIVITIY SCORE: 24
MOBILITY SCORE: 24
MOBILITY SCORE: 24

## 2025-07-15 ASSESSMENT — LIFESTYLE VARIABLES
AUDIT-C TOTAL SCORE: 0
HOW OFTEN DO YOU HAVE A DRINK CONTAINING ALCOHOL: NEVER
HOW OFTEN DO YOU HAVE 6 OR MORE DRINKS ON ONE OCCASION: NEVER
HOW MANY STANDARD DRINKS CONTAINING ALCOHOL DO YOU HAVE ON A TYPICAL DAY: PATIENT DOES NOT DRINK
PRESCIPTION_ABUSE_PAST_12_MONTHS: NO
SKIP TO QUESTIONS 9-10: 1
AUDIT-C TOTAL SCORE: 0
SUBSTANCE_ABUSE_PAST_12_MONTHS: NO

## 2025-07-15 ASSESSMENT — PATIENT HEALTH QUESTIONNAIRE - PHQ9
2. FEELING DOWN, DEPRESSED OR HOPELESS: NOT AT ALL
SUM OF ALL RESPONSES TO PHQ9 QUESTIONS 1 & 2: 0
1. LITTLE INTEREST OR PLEASURE IN DOING THINGS: NOT AT ALL

## 2025-07-15 NOTE — PROGRESS NOTES
Pharmacy Medication History Review    Abhijit Hickman is a 64 y.o. male admitted for No Principal Problem: There is no principal problem currently on the Problem List. Please update the Problem List and refresh.. Pharmacy reviewed the patient's [id number] medications and allergies for accuracy.    The list below reflectives the updated PTA list. Please review each medication in order reconciliation for additional clarification and justification.  Prior to Admission medications    Medication Sig Start Date End Date Taking? Authorizing Provider   alpha lipoic acid 600 mg capsule Take 1 capsule by mouth 1 (one) time per week. 6/15/22  Yes Historical Provider, MD   cholecalciferol (Vitamin D3) 50 mcg (2,000 unit) capsule Take 1 capsule (50 mcg) by mouth 1 (one) time per week.   Yes Historical Provider, MD   coenzyme Q-10 (Co Q-10) 200 mg capsule Take 1 capsule (200 mg) by mouth 1 (one) time per week.   Yes Historical Provider, MD   gabapentin (Neurontin) 300 mg capsule Take 1 capsule (300 mg) by mouth 3 times a day. 12/23/24 12/18/25 Yes Meagan Agudelo MD   nitroglycerin (Nitrostat) 0.4 mg SL tablet DISSOLVE 1 TABLET UNDER THE TONGUE AS NEEDED FOR CHEST PAIN. 12/31/24  yes Evelio Iniguez APRN-CNP   vitamin B complex vit C no.4 (SUPER B COMPLEX + C ORAL) Take 1 capsule by mouth 1 (one) time per week.   Yes Historical Provider, MD        The list below reflectives the updated allergy list. Please review each documented allergy for additional clarification and justification.  Allergies  Reviewed by Gabriela Reich on 7/15/2025   No Known Allergies         Below are additional concerns with the patient's PTA list.      Gabriela Reich

## 2025-07-15 NOTE — CONSULTS
Consults    Reason for Consult:  A-fib    HPI:  Abhijit Hickman is a 64 y.o. year old male with a history of B-cell lymphoma s/p radiation, cervical radiculopathy, and hx of thoracic outlet syndrome , A-fib 2 years ago ( has been cardioverted ) presents for palpitations and fatigue around 3 AM .he had a pocket EKG monitor for A-fib said it was atrial fibrillation.     Patient denies chest pain, BRADY.  Patient states that he was placed on metoprolol and Xarelto for approximately 1 month however discontinued given side effects of Raynaud's along with bleeding of the wrist    Initial Vitals: BP; 153/78  HR: 89  Troponin :6  EKG: A-fib(HR 89/min)       Review of Systems:   A 10+ point ROS was completed and otherwise negative except as noted above and per HPI.    Objective   Vitals:    07/15/25 1500   BP: 153/78   Pulse: 89   Resp: (!) 22   Temp:    SpO2: 99%        Physical Exam:  Constitutional: well developed, awake, alert, no acute distress  ENMT: mucous membranes moist, EOMI, conjunctivae clear  Head/Neck: normocephalic, atraumatic; supple, trachea midline  Respiratory/Thorax: patent airways, no wheezes, rales, or rhonchi  Cardiovascular: Irregular, no murmur  Gastrointestinal: soft, nondistended, non-tender, bowel sounds appreciated  Extremities: palpable peripheral pulses, no edema or cyanosis  Neurological: AO x3, no focal deficits  Psychological: appropriate mood and behavior  Skin: warm and dry    Results from last 7 days   Lab Units 07/15/25  1038   SODIUM mmol/L 141   POTASSIUM mmol/L 4.0   CHLORIDE mmol/L 108*   CO2 mmol/L 26   BUN mg/dL 13   CREATININE mg/dL 0.99   GLUCOSE mg/dL 84   CALCIUM mg/dL 8.8       Results from last 7 days   Lab Units 07/15/25  1038   WBC AUTO x10*3/uL 5.9   HEMOGLOBIN g/dL 15.4   HEMATOCRIT % 46.9   PLATELETS AUTO x10*3/uL 200               Scheduled Medications  Scheduled Medications[1]     Assessment/Plan     # Acute A-fib    [id number] score ; 1    Given the patient's acute  symptomatic atrial fibrillation, he can be scheduled for cardioversion tomorrow.    Plan:     - PO Eliquis 5 mg twice daily  - NPO  - Scheduled for cardioversion tomorrow  - Continue metoprolol      Chronic Medical conditions:  # Paroxysmal A-fib  # B-cell lymphoma s/p radiation  # Cervical radiculopathy  #Thoracic outlet syndrome        This is a preliminary note written by the resident. Please wait for attending addendum for finalization of note and recommendations.    Myrna Bai MD  PGY-1, IM            [1] apixaban, 5 mg, oral, q12h  gabapentin, 300 mg, oral, TID  metoprolol tartrate, 25 mg, oral, BID

## 2025-07-15 NOTE — ED PROVIDER NOTES
History of Present Illness     History provided by: Patient  Limitations to History: none  External Records Reviewed with Brief Summary: Cardiology notes showing a remote paroxysmal afib (Dx March 2023; s/p HAYLEE guided DCCV 3/13/2023)       HPI:  Abhijit Hickman is a 64 y.o. male with paroxysmal atrial fibrillation, B-cell lymphoma s/p radiation, cervical radiculopathy, and hx of thoracic outlet syndrome presents for palpitations.  Patient states that he was woken from bed around 3 AM this morning with a heart rate of 127.  Patient states that he has been having palpitations since morning feels weak and tired.  Patient states that he has been trying to go to sleep however cannot because of the sensations of palpitations.  Patient denies any chest pain, no radiation, no diaphoresis or numbness and tingling of the upper extremities.  Patient states that the past he has been cardioverted to 2 years ago when he was admitted and found to have paroxysmal A-fib.  Patient states that he was placed on metoprolol and Xarelto for approximately 1 month however discontinued given side effects of Raynaud's along with bleeding of the wrist.  States that he is an active biker waking up to 1000 miles each summer.  Patient states that this activity has been consistent and does not reports any increase strenuous activity out of the normal.  Patient denies any fevers, chills, cough, congestion.  Patient currently denies any lightheadedness or dizziness.  Patient does not drink any alcohol.  Patient denies any other symptoms.    Physical Exam   Triage vitals:  T 36.3 °C (97.3 °F)  HR 74  /80  RR 16  O2 98 % None (Room air)    General: Awake, alert, in no acute distress, well appearing  Eyes: Gaze conjugate.  No scleral icterus or injection  HENT: Normo-cephalic, atraumatic. No stridor  CV: Regular rate, irregular rhythm. Radial pulses 2+ bilaterally  Resp: Breathing non-labored, speaking in full sentences.  Clear to  auscultation bilaterally  GI: Soft, non-distended, non-tender. No rebound or guarding.  : Deferred  MSK/Extremities: No gross bony deformities. Moving all extremities  Skin: Warm. Appropriate color  Neuro: Alert. Oriented. Face symmetric. Speech is fluent.  Gross strength and sensation intact in b/l UE and LEs  Psych: Appropriate mood and affect    Medical Decision Making & ED Course   Medical Decision Makin y.o. male with paroxysmal atrial fibrillation, B-cell lymphoma s/p radiation, cervical radiculopathy, and hx of thoracic outlet syndrome presents for palpitations.  Presents stable, no acute distress, nontoxic-appearing.  Patient does appear to be in A-fib however without RVR.    This patient presents with chest pain, with a history suggestive of paroxysmal A-fib. No evidence of volume overload or shock on exam. EKG without signs of active ischemia. EKG without evidence of STEMI. Low suspicion for acute PE (Wells low risk 0), pneumothorax, thoracic aortic dissection, cardiac effusion / tamponade. Overall, ACS is being considered given higher risk features, age, history & physical. HEART score: 2.    Patient will require admission for atrial fibrillation and likely to be cardioverted.    Plan: cardiac monitor, EKG, troponins,CXR, pain control, reassess.     Updates can be seen in the ED course.      Social Determinants of Health which Significantly Impact Care: None identified The following actions were taken to address these social determinants: none    EKG Independent Interpretation: EKG interpreted by myself. Please see ED Course for full interpretation.    Independent Result Review and Interpretation: Relevant laboratory and radiographic results were reviewed and independently interpreted by myself.  As necessary, they are commented on in the ED Course.    Chronic conditions affecting the patient's care: As documented above in MDM    The patient was discussed with the following consultants/services:  None    Care Considerations: As documented above in [facility]    ED Course:  ED Course as of 07/15/25 1608   Tue Jul 15, 2025   1040 I independently reviewed the12 lead ECG completed at 0900 showing afib at a rate of 89 bpm. Axis is right deviated. R wave progressions across precordium is poor. There are no ST elevations/t wave inversions.  QRS, and QT intervals are appropriate.   [KV]   1116 EKG shows atrial fibrillation without RVR, right axis deviation, heart rate of 89, QTc of 413, no interval abnormalities, no ST elevations, no ST depressions, T wave inversion seen in V1.  EKG was interpreted personally by me.  Basic labs unremarkable.  Initial troponin is negative. [YG]   1322 CXR shows no acute cardiopulmonary process. [YG]   1354 We spoke to cardiology who recommended that we start this patient on Eliquis and be admitted for cardioversion. [YG]   1608 Patient has been admitted to general medicine for atrial fibrillation. [YG]      ED Course User Index  [KV] Felicity Sparrow MD  [YG] Destiny Hinton MD         Diagnoses as of 07/15/25 1608   Paroxysmal atrial fibrillation (Multi)     Disposition   As a result of their workup, the patient will require admission to the hospital.  The patient was informed of his diagnosis.  The patient was given the opportunity to ask questions and I answered them. The patient agreed to be admitted to the hospital.    Procedures   Procedures    Patient seen and discussed with ED attending physician.    Destiny Hinton MD  Emergency Medicine     Destiny Hinton MD  Resident  07/15/25 1608

## 2025-07-15 NOTE — H&P
History Of Present Illness  Abhijit Hickman is a 64 y.o. male presenting with past medical history of atrial fibrillation in 2023 who presents to the emergency room for heart palpitations.  Patient states that this morning he woke up at 3 AM feeling very exhausted.  This is the normal time that he wakes up however he felt way more exhausted than normal.  He noted his heart rate being elevated and had some heart palpitations.  He has had history of atrial fibrillation in the past and he had a pocket EKG monitor for which said it was atrial fibrillation.  He has had this happen a couple times since 2023 however goes away after about an hour.  Denies any chest pain shortness of breath or any other associated symptoms.  He did go to work however continue to feel extremely exhausted and his heart rate was continuously in the 120s.  Subsequently decided to come to the emergency room for further evaluation.  Workup in the emergency room significant for EKG of atrial fibrillation with better rate control in the 80s and 90s.  Blood pressure within normal limits.  Otherwise blood work unremarkable.  Cardiology consulted.  Referred to the hospitalist service for admission.              Past Medical History  He has a past medical history of Anemia, Depression, History of B-cell lymphoma, Other abnormalities of gait and mobility (07/12/2018), Other conditions influencing health status, Other enthesopathies, not elsewhere classified (07/12/2018), Paroxysmal A-fib (Multi), Personal history of other diseases of the digestive system (08/23/2021), Personal history of other specified conditions (09/27/2021), Raynaud disease, and Thoracic outlet syndrome.        Surgical History  He has a past surgical history that includes Appendectomy (05/22/2018); Other surgical history (05/17/2022); Cardioversion; and Hernia repair (Bilateral, 10/13/2023).       Social History  He reports that he has never smoked. He has never been exposed to  tobacco smoke. He has never used smokeless tobacco. He reports that he does not drink alcohol and does not use drugs.      Family History  Family History[1]       Allergies  Patient has no known allergies.      Review of Systems   Constitutional:  Negative for chills, fatigue and fever.   HENT:  Negative for congestion, ear pain, facial swelling, hearing loss and trouble swallowing.    Eyes:  Negative for photophobia, pain, redness and visual disturbance.   Respiratory:  Negative for cough, chest tightness, shortness of breath and wheezing.    Cardiovascular:  Negative for chest pain, palpitations and leg swelling.   Gastrointestinal:  Negative for abdominal distention, abdominal pain and nausea.   Endocrine: Negative for cold intolerance, heat intolerance, polydipsia and polyuria.   Genitourinary:  Negative for difficulty urinating, frequency and hematuria.   Skin:  Negative for color change, rash and wound.   Neurological:  Negative for dizziness, light-headedness, numbness and headaches.   Psychiatric/Behavioral:  Negative for agitation, confusion and suicidal ideas.        Physical Exam  GENERAL:   no distress, alert and cooperative  HEENT: Normal Inspection, Mucous membranes moist, No JVD, No Lymphadenopathy  CARDIOVASCULAR: IRR, no murmurs, 2+ equal pulses of the extremities, normal S1 and S 2  RESPIRATORY: Patent airways, CTAB, thorax symmetric, No significant wheezing, Rales or Rhonchi  ABDOMEN: Soft, Non-Tender, Normal Bowel Sounds, No Distention  SKIN: Warm and dry, no lesions, no rashes  EXTREMITIES: normal extremities, no significant cyanosis edema, contusions or wounds, no obsvious clubbing  NEURO: A&O x 3, CN II-XII grossly intact  PSYCH: Appropriate mood and behavior    Additional Physical Exam Notes/Findings      Last Recorded Vitals  /70   Pulse 86   Temp 36.3 °C (97.3 °F) (Tympanic)   Resp (!) 21   Ht 1.829 m (6')   Wt 81.6 kg (180 lb)   SpO2 100%   BMI 24.41 kg/m²     Intake/Output  last 3 Shifts:  No intake/output data recorded.      =========    RELEVANT RESULTS      ==========  Labs  Lab Results   Component Value Date    WBC 5.9 07/15/2025    HGB 15.4 07/15/2025    HCT 46.9 07/15/2025    MCV 93 07/15/2025     07/15/2025     Lab Results   Component Value Date    GLUCOSE 84 07/15/2025    CALCIUM 8.8 07/15/2025     07/15/2025    K 4.0 07/15/2025    CO2 26 07/15/2025     (H) 07/15/2025    BUN 13 07/15/2025    CREATININE 0.99 07/15/2025      Lab Results   Component Value Date    ALT 9 (L) 07/15/2025    AST 14 07/15/2025    ALKPHOS 51 07/15/2025    BILITOT 0.9 07/15/2025          =======      SCHEDULED MEDICATIONS      =======    Scheduled Medications[2]      ==========      PRN MEDICATIONS      =========  guaiFENesin, 600 mg, q12h PRN  melatonin, 3 mg, Nightly PRN        ==============      DIET     ==============  Dietary Orders (From admission, onward)       Start     Ordered    07/15/25 1411  Adult diet Regular  Diet effective now        Question:  Diet type  Answer:  Regular    07/15/25 1410                    ======    Assessment/Plan     =======    ASSESSMENT:  Assessment & Plan  Flutter-fibrillation (Multi)    ___________________________________________________        PLAN:  Recurrent Afib  CHADVASC < 2.  Will defer anticoagulation to cardiology  TTE  Observation to telemetry with cardiology consult  Metoprolol BID  Cont home gabapentin      DVT Prophylaxis  SubQ lovenox.     SUMMARY/DISPOSITION  Expect DC to home when stable.                Efrem Damon DO         [1]   Family History  Problem Relation Name Age of Onset    Diabetes Mother      Stroke Mother      Hypertension Father      Multiple myeloma Father      Diabetes Brother      Colon cancer Paternal Grandmother     [2]   Scheduled medications   Medication Dose Route Frequency    enoxaparin  40 mg subcutaneous q24h    gabapentin  300 mg oral TID    metoprolol tartrate  25 mg oral BID

## 2025-07-15 NOTE — ED TRIAGE NOTES
Pt coming in for palpations. States that it has been going on all morning. States that he feels tired and weak. Wants to go to sleep but can't due to the sensation. Not on blood thinners.

## 2025-07-16 ENCOUNTER — APPOINTMENT (OUTPATIENT)
Dept: CARDIOLOGY | Facility: HOSPITAL | Age: 65
End: 2025-07-16
Payer: COMMERCIAL

## 2025-07-16 ENCOUNTER — PHARMACY VISIT (OUTPATIENT)
Dept: PHARMACY | Facility: CLINIC | Age: 65
End: 2025-07-16
Payer: COMMERCIAL

## 2025-07-16 ENCOUNTER — ANESTHESIA EVENT (OUTPATIENT)
Dept: CARDIOLOGY | Facility: HOSPITAL | Age: 65
End: 2025-07-16
Payer: COMMERCIAL

## 2025-07-16 ENCOUNTER — ANESTHESIA (OUTPATIENT)
Dept: CARDIOLOGY | Facility: HOSPITAL | Age: 65
End: 2025-07-16
Payer: COMMERCIAL

## 2025-07-16 VITALS
RESPIRATION RATE: 16 BRPM | SYSTOLIC BLOOD PRESSURE: 107 MMHG | WEIGHT: 180 LBS | DIASTOLIC BLOOD PRESSURE: 68 MMHG | TEMPERATURE: 97.5 F | HEIGHT: 72 IN | HEART RATE: 56 BPM | OXYGEN SATURATION: 97 % | BODY MASS INDEX: 24.38 KG/M2

## 2025-07-16 PROBLEM — I48.92 FLUTTER-FIBRILLATION (MULTI): Status: RESOLVED | Noted: 2025-07-15 | Resolved: 2025-07-16

## 2025-07-16 PROBLEM — I48.0 PAROXYSMAL ATRIAL FIBRILLATION (MULTI): Status: RESOLVED | Noted: 2023-09-20 | Resolved: 2025-07-16

## 2025-07-16 PROBLEM — I48.91 FLUTTER-FIBRILLATION (MULTI): Status: RESOLVED | Noted: 2025-07-15 | Resolved: 2025-07-16

## 2025-07-16 LAB
ANION GAP SERPL CALC-SCNC: 9 MMOL/L (ref 10–20)
AORTIC VALVE MEAN GRADIENT: 2 MMHG
AORTIC VALVE PEAK VELOCITY: 0.99 M/S
AV PEAK GRADIENT: 4 MMHG
AVA (PEAK VEL): 2.82 CM2
AVA (VTI): 2.76 CM2
BUN SERPL-MCNC: 15 MG/DL (ref 6–23)
CALCIUM SERPL-MCNC: 9.1 MG/DL (ref 8.6–10.3)
CHLORIDE SERPL-SCNC: 106 MMOL/L (ref 98–107)
CO2 SERPL-SCNC: 29 MMOL/L (ref 21–32)
CREAT SERPL-MCNC: 1.07 MG/DL (ref 0.5–1.3)
EGFRCR SERPLBLD CKD-EPI 2021: 77 ML/MIN/1.73M*2
EJECTION FRACTION APICAL 4 CHAMBER: 62.5
EJECTION FRACTION: 60 %
ERYTHROCYTE [DISTWIDTH] IN BLOOD BY AUTOMATED COUNT: 12.8 % (ref 11.5–14.5)
GLUCOSE SERPL-MCNC: 86 MG/DL (ref 74–99)
HCT VFR BLD AUTO: 47.8 % (ref 41–52)
HGB BLD-MCNC: 15.9 G/DL (ref 13.5–17.5)
LEFT ATRIUM VOLUME AREA LENGTH INDEX BSA: 23.8 ML/M2
LEFT VENTRICLE INTERNAL DIMENSION DIASTOLE: 5.2 CM (ref 3.5–6)
LEFT VENTRICULAR OUTFLOW TRACT DIAMETER: 2.3 CM
MCH RBC QN AUTO: 30.5 PG (ref 26–34)
MCHC RBC AUTO-ENTMCNC: 33.3 G/DL (ref 32–36)
MCV RBC AUTO: 92 FL (ref 80–100)
MITRAL VALVE E/A RATIO: 1.38
NRBC BLD-RTO: 0 /100 WBCS (ref 0–0)
PLATELET # BLD AUTO: 218 X10*3/UL (ref 150–450)
POTASSIUM SERPL-SCNC: 3.9 MMOL/L (ref 3.5–5.3)
Q ONSET: 212 MS
QRS COUNT: 15 BEATS
QRS DURATION: 94 MS
QT INTERVAL: 340 MS
QTC CALCULATION(BAZETT): 413 MS
QTC FREDERICIA: 387 MS
R AXIS: 62 DEGREES
RBC # BLD AUTO: 5.22 X10*6/UL (ref 4.5–5.9)
RIGHT VENTRICLE FREE WALL PEAK S': 9.9 CM/S
RIGHT VENTRICLE PEAK SYSTOLIC PRESSURE: 24 MMHG
SODIUM SERPL-SCNC: 140 MMOL/L (ref 136–145)
T AXIS: 31 DEGREES
T OFFSET: 382 MS
TRICUSPID ANNULAR PLANE SYSTOLIC EXCURSION: 2 CM
VENTRICULAR RATE: 89 BPM
WBC # BLD AUTO: 5.8 X10*3/UL (ref 4.4–11.3)

## 2025-07-16 PROCEDURE — 93005 ELECTROCARDIOGRAM TRACING: CPT

## 2025-07-16 PROCEDURE — G0378 HOSPITAL OBSERVATION PER HR: HCPCS

## 2025-07-16 PROCEDURE — RXMED WILLOW AMBULATORY MEDICATION CHARGE

## 2025-07-16 PROCEDURE — 2500000001 HC RX 250 WO HCPCS SELF ADMINISTERED DRUGS (ALT 637 FOR MEDICARE OP)

## 2025-07-16 PROCEDURE — 93005 ELECTROCARDIOGRAM TRACING: CPT | Mod: 59

## 2025-07-16 PROCEDURE — 3700000002 HC GENERAL ANESTHESIA TIME - EACH INCREMENTAL 1 MINUTE: Performed by: INTERNAL MEDICINE

## 2025-07-16 PROCEDURE — 99239 HOSP IP/OBS DSCHRG MGMT >30: CPT | Performed by: INTERNAL MEDICINE

## 2025-07-16 PROCEDURE — 80048 BASIC METABOLIC PNL TOTAL CA: CPT | Performed by: INTERNAL MEDICINE

## 2025-07-16 PROCEDURE — 93306 TTE W/DOPPLER COMPLETE: CPT | Mod: 59

## 2025-07-16 PROCEDURE — 2720000007 HC OR 272 NO HCPCS: Performed by: INTERNAL MEDICINE

## 2025-07-16 PROCEDURE — 3700000001 HC GENERAL ANESTHESIA TIME - INITIAL BASE CHARGE: Performed by: INTERNAL MEDICINE

## 2025-07-16 PROCEDURE — 92960 CARDIOVERSION ELECTRIC EXT: CPT | Performed by: INTERNAL MEDICINE

## 2025-07-16 PROCEDURE — 2500000001 HC RX 250 WO HCPCS SELF ADMINISTERED DRUGS (ALT 637 FOR MEDICARE OP): Performed by: INTERNAL MEDICINE

## 2025-07-16 PROCEDURE — 7100000002 HC RECOVERY ROOM TIME - EACH INCREMENTAL 1 MINUTE: Performed by: INTERNAL MEDICINE

## 2025-07-16 PROCEDURE — 7100000001 HC RECOVERY ROOM TIME - INITIAL BASE CHARGE: Performed by: INTERNAL MEDICINE

## 2025-07-16 PROCEDURE — 36415 COLL VENOUS BLD VENIPUNCTURE: CPT | Performed by: INTERNAL MEDICINE

## 2025-07-16 PROCEDURE — 92960 CARDIOVERSION ELECTRIC EXT: CPT | Mod: 59 | Performed by: INTERNAL MEDICINE

## 2025-07-16 PROCEDURE — 85027 COMPLETE CBC AUTOMATED: CPT | Performed by: INTERNAL MEDICINE

## 2025-07-16 PROCEDURE — 2500000004 HC RX 250 GENERAL PHARMACY W/ HCPCS (ALT 636 FOR OP/ED): Performed by: ANESTHESIOLOGIST ASSISTANT

## 2025-07-16 PROCEDURE — 93306 TTE W/DOPPLER COMPLETE: CPT | Performed by: STUDENT IN AN ORGANIZED HEALTH CARE EDUCATION/TRAINING PROGRAM

## 2025-07-16 RX ORDER — METOPROLOL TARTRATE 25 MG/1
25 TABLET, FILM COATED ORAL 2 TIMES DAILY
Qty: 60 TABLET | Refills: 1 | Status: SHIPPED | OUTPATIENT
Start: 2025-07-16

## 2025-07-16 RX ORDER — PROPOFOL 10 MG/ML
INJECTION, EMULSION INTRAVENOUS AS NEEDED
Status: DISCONTINUED | OUTPATIENT
Start: 2025-07-16 | End: 2025-07-16

## 2025-07-16 RX ADMIN — APIXABAN 5 MG: 5 TABLET, FILM COATED ORAL at 09:31

## 2025-07-16 RX ADMIN — METOPROLOL TARTRATE 25 MG: 25 TABLET, FILM COATED ORAL at 09:31

## 2025-07-16 RX ADMIN — GABAPENTIN 300 MG: 300 CAPSULE ORAL at 14:03

## 2025-07-16 RX ADMIN — GABAPENTIN 300 MG: 300 CAPSULE ORAL at 09:31

## 2025-07-16 RX ADMIN — PROPOFOL 80 MG: 10 INJECTION, EMULSION INTRAVENOUS at 07:51

## 2025-07-16 RX ADMIN — PROPOFOL 20 MG: 10 INJECTION, EMULSION INTRAVENOUS at 07:52

## 2025-07-16 SDOH — HEALTH STABILITY: MENTAL HEALTH: CURRENT SMOKER: 0

## 2025-07-16 ASSESSMENT — COGNITIVE AND FUNCTIONAL STATUS - GENERAL
DAILY ACTIVITIY SCORE: 24
DAILY ACTIVITIY SCORE: 24
MOBILITY SCORE: 24
MOBILITY SCORE: 24

## 2025-07-16 ASSESSMENT — PAIN - FUNCTIONAL ASSESSMENT
PAIN_FUNCTIONAL_ASSESSMENT: 0-10

## 2025-07-16 ASSESSMENT — PAIN SCALES - GENERAL
PAINLEVEL_OUTOF10: 0 - NO PAIN
PAIN_LEVEL: 0

## 2025-07-16 NOTE — H&P
History and Physical   Pre Surgical Review (< 30 days)      History & Physical Reviewed    I have reviewed the History and Physical dated:  7/15/25   History and Physical reviewed and relevant findings noted. Patient examined to review pertinent physical  findings.: No significant changes   Home Medications Reviewed: see EMR   Allergies Reviewed: no changes noted      Home Medications  Current Outpatient Medications   Medication Instructions    alpha lipoic acid 600 mg capsule 1 capsule, oral, Weekly    cholecalciferol (VITAMIN D3) 50 mcg, oral, Weekly    coenzyme Q-10 (CO Q-10) 200 mg, oral, Weekly    gabapentin (NEURONTIN) 300 mg, oral, 3 times daily    nitroglycerin (Nitrostat) 0.4 mg SL tablet DISSOLVE 1 TABLET UNDER THE TONGUE AS NEEDED FOR CHEST PAIN.    vitamin B complex vit C no.4 (SUPER B COMPLEX + C ORAL) 1 capsule, oral, Weekly        Allergies  RX Allergies[1]    Physical Exam  Physical Exam  Constitutional:       General: He is in acute distress.     Cardiovascular:      Rate and Rhythm: Rhythm irregular.      Comments: + pulses all extremities.  Pulmonary:      Effort: Pulmonary effort is normal. No respiratory distress.   Abdominal:      Palpations: Abdomen is soft.     Skin:     General: Skin is warm and dry.     Neurological:      General: No focal deficit present.      Mental Status: He is alert and oriented to person, place, and time.     Psychiatric:         Mood and Affect: Mood normal.         Behavior: Behavior normal.          Airway/Sedation Assessment     Assessment by anesthesia See anesthesia airway/sedation assessment     ·  Mouth Opening OK yes      Neck Flexibility OK yes      Loose Teeth No   ·  Oropharyngeal Classification See anesthesia airway/sedation assessment   ·  ASA PS Classification ASA III - Patient with severe systemic disease       Sedation Plan See anesthesia airway/sedation assessment     Risks, benefits, and alternatives discussed with patient.     COMFORT  (Enhanced Recovery After Surgery):  ·  ERAS Patient: No      Consent:   COVID-19 Consent:  ·  COVID-19 Risk Consent Surgeon has reviewed key risks related to the risk of wendi COVID-19 and if they contract COVID-19 what the risks are.     Salma Moreno, APRN-CNP          [1] No Known Allergies

## 2025-07-16 NOTE — ANESTHESIA PREPROCEDURE EVALUATION
Patient: Abhijit Hickman    Procedure Information       Date/Time: 07/16/25 7736    Procedure: Cardioversion, External - ok for anesthesia per Yadira    Location: [facility] / [facility]    Providers: Apolinar Alexandre, DO            Relevant Problems   Anesthesia (within normal limits)      Cardiac   (+) Flutter-fibrillation (Multi)   (+) Paroxysmal atrial fibrillation (Multi)      Neuro   (+) Cervical radiculopathy   (+) Depressive disorder   (+) Generalized anxiety disorder   (+) Left median nerve neuropathy      GI   (+) Gastrointestinal hemorrhage   (+) Rectal hemorrhage      Hematology   (+) Anemia   (+) B-cell lymphoma      HEENT   (+) Hearing loss       Clinical information reviewed:    Allergies  Meds               NPO Detail:  No data recorded     Physical Exam    Airway  Mallampati: II  TM distance: >3 FB  Neck ROM: full  Mouth opening: 3 or more finger widths     Cardiovascular   Rhythm: irregular  Rate: abnormal     Dental - normal exam     Pulmonary - normal examBreath sounds clear to auscultation     Abdominal            Anesthesia Plan    History of general anesthesia?: yes  History of complications of general anesthesia?: no    ASA 3     MAC     The patient is not a current smoker.    intravenous induction   Anesthetic plan and risks discussed with patient.  Use of blood products discussed with patient who.    Plan discussed with CAA.

## 2025-07-16 NOTE — HOSPITAL COURSE
64 y.o. male presenting with past medical history of atrial fibrillation in 2023 who presents to the emergency room for heart palpitations.  Patient states that this morning he woke up at 3 AM feeling very exhausted.  This is the normal time that he wakes up however he felt way more exhausted than normal.  He noted his heart rate being elevated and had some heart palpitations.  He has had history of atrial fibrillation in the past and he had a pocket EKG monitor for which said it was atrial fibrillation.  He has had this happen a couple times since 2023 however goes away after about an hour.  Denies any chest pain shortness of breath or any other associated symptoms.  He did go to work however continue to feel extremely exhausted and his heart rate was continuously in the 120s.  Subsequently decided to come to the emergency room for further evaluation.  Workup in the emergency room significant for EKG of atrial fibrillation with better rate control in the 80s and 90s.  Blood pressure within normal limits.  Otherwise blood work unremarkable.  Cardiology consulted.  Referred to the hospitalist service for admission.   Metoprolol started.   Cardioversion completed successfully and maintained NSR.   Work up negative otherwise.  Eliquis and metoprolol recommended. Cleared by cardiology for discharge with outpatient followup.

## 2025-07-16 NOTE — DISCHARGE SUMMARY
Discharge Diagnosis  Flutter-fibrillation (Multi)    Issues Requiring Follow-Up  Cardiology    Test Results Pending At Discharge  Pending Labs       No current pending labs.            Hospital Course  64 y.o. male presenting with past medical history of atrial fibrillation in 2023 who presents to the emergency room for heart palpitations.  Patient states that this morning he woke up at 3 AM feeling very exhausted.  This is the normal time that he wakes up however he felt way more exhausted than normal.  He noted his heart rate being elevated and had some heart palpitations.  He has had history of atrial fibrillation in the past and he had a pocket EKG monitor for which said it was atrial fibrillation.  He has had this happen a couple times since 2023 however goes away after about an hour.  Denies any chest pain shortness of breath or any other associated symptoms.  He did go to work however continue to feel extremely exhausted and his heart rate was continuously in the 120s.  Subsequently decided to come to the emergency room for further evaluation.  Workup in the emergency room significant for EKG of atrial fibrillation with better rate control in the 80s and 90s.  Blood pressure within normal limits.  Otherwise blood work unremarkable.  Cardiology consulted.  Referred to the hospitalist service for admission.   Metoprolol started.   Cardioversion completed successfully and maintained NSR.   Work up negative otherwise.  Eliquis and metoprolol recommended. Cleared by cardiology for discharge with outpatient followup.      The patient has been advised to maintain regular follow-up appointments with their primary care physician, and they have expressed their willingness to do so.     Discharge time spent 35 minutes.      Visit Vitals  /68   Pulse 56   Temp 36.4 °C (97.5 °F)   Resp 16     Vitals:    07/15/25 0902   Weight: 81.6 kg (180 lb)       Immunization History   Administered Date(s) Administered     COVID-19, mRNA, LNP-S, PF, 30 mcg/0.3 mL dose 03/21/2021, 04/11/2021, 12/21/2021    Tdap vaccine, age 7 year and older (BOOSTRIX, ADACEL) 11/11/2019    Zoster vaccine, recombinant, adult (SHINGRIX) 08/18/2022, 10/28/2022       Results        Pertinent Physical Exam At Time of Discharge    GENERAL:   no distress, alert and cooperative  HEENT: Normal Inspection, Mucous membranes moist, No JVD, No Lymphadenopathy  CARDIOVASCULAR: RRR, no murmurs, 2+ equal pulses of the extremities, normal S1 and S 2  RESPIRATORY: Patent airways, CTAB, normal breath sounds with good chest expansion, thorax symmetric, No Wheezes, Rales or Rhonchi  ABDOMEN: Soft, Non-Tender, Normal Bowel Sounds, No Distention  SKIN: Warm and dry, no lesions, no rashes  EXTREMITIES: normal extremities, no cyanosis edema, contusions or wounds, no clubbing  NEURO: A&O x 3, CN II-XII grossly intact  PSYCH: Appropriate mood and behavior      Home Medications     Medication List      START taking these medications     apixaban 5 mg tablet; Commonly known as: Eliquis; Take 1 tablet (5 mg)   by mouth every 12 hours.   metoprolol tartrate 25 mg tablet; Commonly known as: Lopressor; Take 1   tablet (25 mg) by mouth 2 times a day.     CONTINUE taking these medications     alpha lipoic acid 600 mg capsule   Co Q-10 200 mg capsule; Generic drug: coenzyme Q-10   gabapentin 300 mg capsule; Commonly known as: Neurontin; Take 1 capsule   (300 mg) by mouth 3 times a day.   nitroglycerin 0.4 mg SL tablet; Commonly known as: Nitrostat; DISSOLVE 1   TABLET UNDER THE TONGUE AS NEEDED FOR CHEST PAIN.   SUPER B COMPLEX + C ORAL   Vitamin D3 50 mcg (2,000 units) capsule; Generic drug: cholecalciferol       Outpatient Follow-Up  Future Appointments   Date Time Provider Department Center   7/28/2025  4:00 PM Tonya Pleitez MD [id number] [place]   8/11/2025  8:00 AM James C Ramicone, DO [id number] [place]   10/31/2025  2:45 PM Pancho Gay MD [id number] [place]   12/31/2025   7:30 AM NADINE Kc-CNP [facility] Vivek Damon, DO

## 2025-07-16 NOTE — ANESTHESIA POSTPROCEDURE EVALUATION
Patient: Abhijit Hickman    Procedure Summary       Date: 07/16/25 Room / Location: [facility] / [facility]    Anesthesia Start: 0748 Anesthesia Stop: 0800    Procedure: Cardioversion, External Diagnosis: Paroxysmal atrial fibrillation (Multi)    Providers: Apolinar Alexandre DO Responsible Provider: Ramesh Abreu MD    Anesthesia Type: MAC ASA Status: 3            Anesthesia Type: MAC    Vitals Value Taken Time   /91 07/16/25 08:01   Temp 36.0 07/16/25 08:01   Pulse 60 07/16/25 08:01   Resp 18 07/16/25 08:01   SpO2 99 07/16/25 08:01       Anesthesia Post Evaluation    Patient location during evaluation: bedside  Patient participation: complete - patient participated  Level of consciousness: awake  Pain score: 0  Pain management: adequate  Airway patency: patent  Cardiovascular status: acceptable  Respiratory status: acceptable  Hydration status: acceptable  Postoperative Nausea and Vomiting: none        There were no known notable events for this encounter.

## 2025-07-16 NOTE — CARE PLAN
Patient underwent DC cardioversion to sinus rhythm this a.m.  Okay for discharge on Eliquis and metoprolol  A referral has been placed to EP for outpatient evaluation for pulmonary vein cryoablation

## 2025-07-16 NOTE — POST-PROCEDURE NOTE
Physician Transition of Care Summary  Invasive Cardiovascular Lab    Procedure Date: 7/16/2025  Attending:    * Apolinar Alexandre - Primary  Resident/Fellow/Other Assistant: Surgeons and Role:  * No surgeons found with a matching role *    Indications:   Pre-op Diagnosis      * Paroxysmal atrial fibrillation (Multi) [I48.0]    Post-procedure diagnosis:   Normal sinus rhythm    Procedure(s):   Cardioversion, External  [id number] - [place] CARDIOVERSION ELECTIVE ARRHYTHMIA EXTERNAL        Procedure Findings:   Atrial fibrillation    Description of the Procedure:   Patient was brought to the Cath Lab in fasting state  Clinically was confirmed that he had been in A-fib for less than 48 hours  Anterior/posterior pad placement  MAC per anesthesia  200 J to sinus rhythm    Complications:   None    Stents/Implants:   Implants       No implant documentation for this case.            Anticoagulation/Antiplatelet Plan:   Eliquis    Estimated Blood Loss:   0 mL    Anesthesia: General Anesthesia Staff: Anesthesiologist: Ramesh Abreu MD  C-AA: LOBITO Morrison    Any Specimen(s) Removed:   No specimens collected during this procedure.    Disposition:   Stable for cardiac discharge  Continue Eliquis on discharge  Referral has been placed to EP for outpatient evaluation for pulmonary vein cryo      Electronically signed by: Apolinar Alexandre DO, 7/16/2025 8:09 AM

## 2025-07-16 NOTE — NURSING NOTE
Patient transported back to room with transport; stable vital signs, no complaints of pain, on room air; report called to LINDSEY Solitario

## 2025-07-16 NOTE — DISCHARGE INSTRUCTIONS
***********************PLEASE FOLLOW THE INSTRUCTIONS BELOW*************************      - Please follow up with Dr. Ramicone as scheduled (electrophysiology).      Thank you for letting us care for you!  Elizabeth Still, Discharge RN

## 2025-07-17 ENCOUNTER — TELEPHONE (OUTPATIENT)
Dept: PRIMARY CARE | Facility: CLINIC | Age: 65
End: 2025-07-17

## 2025-07-17 ENCOUNTER — TELEPHONE (OUTPATIENT)
Dept: PRIMARY CARE | Facility: CLINIC | Age: 65
End: 2025-07-17
Payer: COMMERCIAL

## 2025-07-17 ENCOUNTER — PATIENT OUTREACH (OUTPATIENT)
Dept: PRIMARY CARE | Facility: CLINIC | Age: 65
End: 2025-07-17
Payer: COMMERCIAL

## 2025-07-17 LAB
ATRIAL RATE: 57 BPM
ATRIAL RATE: 58 BPM
ATRIAL RATE: 98 BPM
P AXIS: 73 DEGREES
P AXIS: 75 DEGREES
P OFFSET: 183 MS
P OFFSET: 186 MS
P ONSET: 123 MS
P ONSET: 123 MS
PR INTERVAL: 174 MS
PR INTERVAL: 174 MS
Q ONSET: 210 MS
Q ONSET: 210 MS
Q ONSET: 224 MS
QRS COUNT: 13 BEATS
QRS COUNT: 9 BEATS
QRS COUNT: 9 BEATS
QRS DURATION: 102 MS
QRS DURATION: 104 MS
QRS DURATION: 106 MS
QT INTERVAL: 354 MS
QT INTERVAL: 412 MS
QT INTERVAL: 416 MS
QTC CALCULATION(BAZETT): 401 MS
QTC CALCULATION(BAZETT): 408 MS
QTC CALCULATION(BAZETT): 408 MS
QTC FREDERICIA: 389 MS
QTC FREDERICIA: 405 MS
QTC FREDERICIA: 411 MS
R AXIS: -1 DEGREES
R AXIS: -35 DEGREES
R AXIS: -41 DEGREES
T AXIS: 39 DEGREES
T AXIS: 50 DEGREES
T AXIS: 53 DEGREES
T OFFSET: 401 MS
T OFFSET: 416 MS
T OFFSET: 418 MS
VENTRICULAR RATE: 57 BPM
VENTRICULAR RATE: 58 BPM
VENTRICULAR RATE: 80 BPM

## 2025-07-17 NOTE — PROGRESS NOTES
Discharge Facility: [facility]  Discharge Diagnosis: Flutter-fibrillation   Admission Date: 07/15/2025  Discharge Date: 07/16/2025    PCP Appointment Date: 07/28/2025, CPE, patient states that he notified office of hospitalization  Specialist Appointment Date: Cardio 08/11/2025, 10/31/2025, Pain Med 12/31/2025  Hospital Encounter and Summary Linked: Yes  ED to Hosp-Admission (Discharged) with Efrem Damon DO; Felicity Sparrow MD (07/15/2025)     See discharge assessment below for further details    Wrap Up  Wrap Up Additional Comments: CM spoke to patient via phone. he states that he is doing well at home. He has all dscharge medication and denies any side effects at this time. PCP appointment is scheduled for 07/28/2025. patient states that he has notified the office of this hospitalization. He has been given this CM's contact information and is encouraged to call with ny questions. He was thankful for this call. (7/17/2025 10:10 AM)    Medications  Medications reviewed with patient/caregiver?: Yes (7/17/2025 10:10 AM)  Is the patient having any side effects they believe may be caused by any medication additions or changes?: No (7/17/2025 10:10 AM)  Does the patient have all medications ordered at discharge?: Yes (7/17/2025 10:10 AM)  Prescription Comments: Scripts given at discharge for Eliquis and Metoprolol Tartrate (7/17/2025 10:10 AM)  Is the patient taking all medications as directed (includes completed medication regime)?: Yes (7/17/2025 10:10 AM)  Medication Comments: patient denies any issues obtaining or affording medication (7/17/2025 10:10 AM)    Appointments  Does the patient have a primary care provider?: Yes (7/17/2025 10:10 AM)  Care Management Interventions: Verified appointment date/time/provider (07/28/2025, CPE, patient states that he notified office of hospitalization) (7/17/2025 10:10 AM)  Has the patient kept scheduled appointments due by today?: Yes (7/17/2025 10:10  AM)    Self Management  What is the home health agency?: N/A (7/17/2025 10:10 AM)  What Durable Medical Equipment (DME) was ordered?: N/A (7/17/2025 10:10 AM)    Patient Teaching  Does the patient have access to their discharge instructions?: Yes (7/17/2025 10:10 AM)  Care Management Interventions: Reviewed instructions with patient (7/17/2025 10:10 AM)  What is the patient's perception of their health status since discharge?: Improving (7/17/2025 10:10 AM)  Is the patient/caregiver able to teach back the hierarchy of who to call/visit for symptoms/problems? PCP, Specialist, Home Health nurse, Urgent Care, ED, 911: Yes (7/17/2025 10:10 AM)

## 2025-07-17 NOTE — TELEPHONE ENCOUNTER
Patient went to ER and was diagnosed with Afib. A Cardioversion procedure was completed as well. He is scheduled for 7/28/25 to see you.

## 2025-07-22 ENCOUNTER — TELEPHONE (OUTPATIENT)
Dept: CARDIOLOGY | Facility: CLINIC | Age: 65
End: 2025-07-22
Payer: COMMERCIAL

## 2025-07-28 ENCOUNTER — APPOINTMENT (OUTPATIENT)
Dept: PRIMARY CARE | Facility: CLINIC | Age: 65
End: 2025-07-28
Payer: COMMERCIAL

## 2025-07-28 VITALS
HEART RATE: 52 BPM | SYSTOLIC BLOOD PRESSURE: 120 MMHG | HEIGHT: 72 IN | WEIGHT: 190.2 LBS | OXYGEN SATURATION: 96 % | BODY MASS INDEX: 25.76 KG/M2 | DIASTOLIC BLOOD PRESSURE: 80 MMHG

## 2025-07-28 DIAGNOSIS — Z12.5 PROSTATE CANCER SCREENING: ICD-10-CM

## 2025-07-28 DIAGNOSIS — K76.89 LIVER CYST: ICD-10-CM

## 2025-07-28 DIAGNOSIS — Z00.00 ANNUAL PHYSICAL EXAM: Primary | ICD-10-CM

## 2025-07-28 PROCEDURE — 3008F BODY MASS INDEX DOCD: CPT | Performed by: INTERNAL MEDICINE

## 2025-07-28 PROCEDURE — 1036F TOBACCO NON-USER: CPT | Performed by: INTERNAL MEDICINE

## 2025-07-28 PROCEDURE — 1159F MED LIST DOCD IN RCRD: CPT | Performed by: INTERNAL MEDICINE

## 2025-07-28 PROCEDURE — 1124F ACP DISCUSS-NO DSCNMKR DOCD: CPT | Performed by: INTERNAL MEDICINE

## 2025-07-28 PROCEDURE — 99397 PER PM REEVAL EST PAT 65+ YR: CPT | Performed by: INTERNAL MEDICINE

## 2025-07-28 PROCEDURE — 1160F RVW MEDS BY RX/DR IN RCRD: CPT | Performed by: INTERNAL MEDICINE

## 2025-07-28 ASSESSMENT — COLUMBIA-SUICIDE SEVERITY RATING SCALE - C-SSRS
6. HAVE YOU EVER DONE ANYTHING, STARTED TO DO ANYTHING, OR PREPARED TO DO ANYTHING TO END YOUR LIFE?: NO
1. IN THE PAST MONTH, HAVE YOU WISHED YOU WERE DEAD OR WISHED YOU COULD GO TO SLEEP AND NOT WAKE UP?: NO
2. HAVE YOU ACTUALLY HAD ANY THOUGHTS OF KILLING YOURSELF?: NO

## 2025-07-28 ASSESSMENT — PATIENT HEALTH QUESTIONNAIRE - PHQ9
1. LITTLE INTEREST OR PLEASURE IN DOING THINGS: NOT AT ALL
2. FEELING DOWN, DEPRESSED OR HOPELESS: NOT AT ALL
SUM OF ALL RESPONSES TO PHQ9 QUESTIONS 1 AND 2: 0

## 2025-07-28 NOTE — PROGRESS NOTES
Subjective   Patient ID: Abhijit Hickman is a 64 y.o. male who presents for Annual Exam.    HPI   Here for annual exam  Since last physical had admission for Afib.Had  cardioversion.On metoprolol and eliquis.scheduled for cardioversion.  Seen at [facility].  Had biliary usg ,need rpt.  Colonoscopy completed 2021,rpt next year  Not high risk for prostate cancer  Follows healthy diet and exercises regularly without symptoms  Review of Systems  General: No significant change in weight, no fatigue, no fever, chills, or night sweats.  HEENT: No headache, dizziness, syncope. No blurred vision, double vision. No hearing loss, or ringing. No nasal discharge, sinus problems. No loose teeth, sore throat, hoarseness or neck stiffness.   Respiratory: No cough, mucous in throat, hemoptysis, wheezing, or shortness of breath. No snoring.  Cardiac: No chest pain, palpitations, orthopnea. No leg swelling or claudication pain.   Gastrointestinal: No indigestion, heartburn, nausea, vomiting, diarrhea, constipation, rectal bleeding or hemorrhoids.  Genitourinary: No UTI symptoms, stones, incontinence.No nocturia or hesitancy  Endocrine: No excess thirst or urination.  Hematopoietic: No anemia, easy bruising or bleeding. No history of blood transfusion.  Neuro: No localized weakness, numbness or tingling. No tremor, history of seizure. No history of memory problems.  Psychological: No anxiety, depression or sleep disturbance.   Objective   /81   Pulse 52   Ht 1.829 m (6')   Wt 86.3 kg (190 lb 3.2 oz)   SpO2 96%   BMI 25.80 kg/m²     Physical Exam  In general, well-appearing, not in acute distress, alert and oriented.  HEENT: No pallor or icterus  Neck: No lymphadenopathy, no stiffness.  Supple.  .No JVD.No goiter  Chest: Clear to auscultation, good air entry.  CVS: S1 and S2 regular.  No murmur, no gallop, S3 or S4.  No peripheral edema.  No carotid bruit.  Abdomen: Soft, no tenderness, no  hepatosplenomegaly.  Extremities: No calf tenderness.  No peripheral edema.  Has varicose veins  Neuro: No focal deficits.  Psych: Mood and affect normal.  Good judgment and insight   :declined  Assessment/Plan   Problem List Items Addressed This Visit           ICD-10-CM    Annual physical exam - Primary Z00.00    Relevant Orders    Lipid Panel     Other Visit Diagnoses         Codes      Prostate cancer screening     Z12.5    Relevant Orders    Prostate Specific Antigen      Liver cyst     K76.89    Relevant Orders    US biliary system          Preventive exam and counseling completed  Check PSA for screening and lipid profile  Reviewed recent hospital notes transitional care management notes  He is currently in sinus rhythm.  Continue medical management.  Has cardiology follow-up for cardioversion  Pain managed  Repeat liver and kidney ultrasound to follow-up on complex cyst  [facility] the [facility]  Declines pneumonia vaccine  Follow-up in 1 year and as needed

## 2025-07-28 NOTE — PROGRESS NOTES
"Patient: Abhijit Hickman  : 1960  PCP: Tonya Pleitez MD  MRN: [id number]  Program: Transitional Care Management  Status: Enrolled  Effective Dates: 2025 - present  Responsible Staff: Marilu Vogel  Social Drivers to be Addressed: No information to display         Abhijit Hickman is a 64 y.o. male presenting today for follow-up after being discharged from the hospital {Numbers; 1-14:45028} days ago. The main problem requiring admission was ***. The discharge summary and/or Transitional Care Management documentation was reviewed. Medication reconciliation was performed as indicated via the \"Bart as Reviewed\" timestamp.     Abhijit Hickman was contacted by Transitional Care Management services two days after his discharge. This encounter and supporting documentation was reviewed.    Review of Systems    /81   Pulse 52   Ht 1.829 m (6')   Wt 86.3 kg (190 lb 3.2 oz)   SpO2 96%   BMI 25.80 kg/m²     Physical Exam    The complexity of medical decision making for this patient's transitional care is {DESC; MODERATE/HIGH:72570}.    Assessment/Plan   {Assess/PlanSmartLinks:51733}    "

## 2025-07-29 ENCOUNTER — PATIENT OUTREACH (OUTPATIENT)
Dept: PRIMARY CARE | Facility: CLINIC | Age: 65
End: 2025-07-29
Payer: COMMERCIAL

## 2025-07-29 LAB
Q ONSET: 212 MS
QRS COUNT: 15 BEATS
QRS DURATION: 94 MS
QT INTERVAL: 340 MS
QTC CALCULATION(BAZETT): 413 MS
QTC FREDERICIA: 387 MS
R AXIS: 62 DEGREES
T AXIS: 31 DEGREES
T OFFSET: 382 MS
VENTRICULAR RATE: 89 BPM

## 2025-07-30 LAB
CHOLEST SERPL-MCNC: 184 MG/DL
CHOLEST/HDLC SERPL: 3.5 (CALC)
HDLC SERPL-MCNC: 52 MG/DL
LDLC SERPL CALC-MCNC: 117 MG/DL (CALC)
NONHDLC SERPL-MCNC: 132 MG/DL (CALC)
PSA SERPL-MCNC: 1.63 NG/ML
TRIGL SERPL-MCNC: 61 MG/DL

## 2025-08-08 ENCOUNTER — HOSPITAL ENCOUNTER (OUTPATIENT)
Dept: RADIOLOGY | Facility: CLINIC | Age: 65
Discharge: HOME | End: 2025-08-08
Payer: COMMERCIAL

## 2025-08-08 DIAGNOSIS — K76.89 LIVER CYST: ICD-10-CM

## 2025-08-08 PROCEDURE — 76705 ECHO EXAM OF ABDOMEN: CPT

## 2025-08-11 ENCOUNTER — APPOINTMENT (OUTPATIENT)
Dept: CARDIOLOGY | Facility: CLINIC | Age: 65
End: 2025-08-11
Payer: COMMERCIAL

## 2025-08-13 PROBLEM — D64.9 ANEMIA: Status: RESOLVED | Noted: 2023-09-19 | Resolved: 2025-08-13

## 2025-08-15 ENCOUNTER — OFFICE VISIT (OUTPATIENT)
Dept: CARDIOLOGY | Facility: CLINIC | Age: 65
End: 2025-08-15
Payer: COMMERCIAL

## 2025-08-15 VITALS
SYSTOLIC BLOOD PRESSURE: 120 MMHG | HEART RATE: 83 BPM | DIASTOLIC BLOOD PRESSURE: 60 MMHG | HEIGHT: 72 IN | OXYGEN SATURATION: 100 % | WEIGHT: 179 LBS | BODY MASS INDEX: 24.24 KG/M2

## 2025-08-15 DIAGNOSIS — I48.0 PAROXYSMAL A-FIB (MULTI): Primary | ICD-10-CM

## 2025-08-15 DIAGNOSIS — I48.0 PAROXYSMAL A-FIB (MULTI): ICD-10-CM

## 2025-08-15 PROCEDURE — 99212 OFFICE O/P EST SF 10 MIN: CPT

## 2025-08-15 PROCEDURE — 99205 OFFICE O/P NEW HI 60 MIN: CPT | Performed by: INTERNAL MEDICINE

## 2025-08-15 PROCEDURE — 1036F TOBACCO NON-USER: CPT | Performed by: INTERNAL MEDICINE

## 2025-08-15 PROCEDURE — 1159F MED LIST DOCD IN RCRD: CPT | Performed by: INTERNAL MEDICINE

## 2025-08-15 PROCEDURE — 3008F BODY MASS INDEX DOCD: CPT | Performed by: INTERNAL MEDICINE

## 2025-08-15 RX ORDER — FLECAINIDE ACETATE 50 MG/1
50 TABLET ORAL 2 TIMES DAILY
Qty: 60 TABLET | Refills: 11 | Status: SHIPPED | OUTPATIENT
Start: 2025-08-15 | End: 2026-08-15

## 2025-09-04 ENCOUNTER — HOSPITAL ENCOUNTER (OUTPATIENT)
Dept: CARDIOLOGY | Facility: HOSPITAL | Age: 65
Setting detail: OUTPATIENT SURGERY
Discharge: HOME | End: 2025-09-04
Payer: COMMERCIAL

## 2025-09-04 ENCOUNTER — ANESTHESIA (OUTPATIENT)
Dept: CARDIOLOGY | Facility: HOSPITAL | Age: 65
End: 2025-09-04
Payer: COMMERCIAL

## 2025-09-04 ENCOUNTER — ANESTHESIA EVENT (OUTPATIENT)
Dept: CARDIOLOGY | Facility: HOSPITAL | Age: 65
End: 2025-09-04
Payer: COMMERCIAL

## 2025-09-04 ENCOUNTER — HOSPITAL ENCOUNTER (OUTPATIENT)
Facility: HOSPITAL | Age: 65
Setting detail: OUTPATIENT SURGERY
Discharge: HOME | End: 2025-09-04
Attending: INTERNAL MEDICINE | Admitting: INTERNAL MEDICINE
Payer: COMMERCIAL

## 2025-09-04 VITALS
TEMPERATURE: 97.2 F | DIASTOLIC BLOOD PRESSURE: 64 MMHG | SYSTOLIC BLOOD PRESSURE: 131 MMHG | WEIGHT: 165.34 LBS | RESPIRATION RATE: 16 BRPM | HEART RATE: 65 BPM | BODY MASS INDEX: 22.4 KG/M2 | HEIGHT: 72 IN | OXYGEN SATURATION: 100 %

## 2025-09-04 DIAGNOSIS — I48.0 PAROXYSMAL ATRIAL FIBRILLATION (MULTI): ICD-10-CM

## 2025-09-04 DIAGNOSIS — I48.0 PAROXYSMAL A-FIB (MULTI): Primary | ICD-10-CM

## 2025-09-04 LAB
ABO GROUP (TYPE) IN BLOOD: NORMAL
ABO GROUP (TYPE) IN BLOOD: NORMAL
ACT BLD: 231 SEC (ref 83–199)
ACT BLD: 371 SEC (ref 83–199)
ACT BLD: 372 SEC (ref 83–199)
ANION GAP SERPL CALC-SCNC: 18 MMOL/L (ref 10–20)
ANTIBODY SCREEN: NORMAL
APTT PPP: 32 SECONDS (ref 26–36)
BUN SERPL-MCNC: 21 MG/DL (ref 6–23)
CALCIUM SERPL-MCNC: 9.3 MG/DL (ref 8.6–10.3)
CHLORIDE SERPL-SCNC: 104 MMOL/L (ref 98–107)
CO2 SERPL-SCNC: 19 MMOL/L (ref 21–32)
CREAT SERPL-MCNC: 1.17 MG/DL (ref 0.5–1.3)
EGFRCR SERPLBLD CKD-EPI 2021: 69 ML/MIN/1.73M*2
ERYTHROCYTE [DISTWIDTH] IN BLOOD BY AUTOMATED COUNT: 12.3 % (ref 11.5–14.5)
GLUCOSE SERPL-MCNC: 73 MG/DL (ref 74–99)
HCT VFR BLD AUTO: 42.2 % (ref 41–52)
HGB BLD-MCNC: 14.5 G/DL (ref 13.5–17.5)
INR PPP: 1.3 (ref 0.9–1.1)
MCH RBC QN AUTO: 30 PG (ref 26–34)
MCHC RBC AUTO-ENTMCNC: 34.4 G/DL (ref 32–36)
MCV RBC AUTO: 87 FL (ref 80–100)
NRBC BLD-RTO: 0 /100 WBCS (ref 0–0)
PLATELET # BLD AUTO: 184 X10*3/UL (ref 150–450)
POTASSIUM SERPL-SCNC: 4.7 MMOL/L (ref 3.5–5.3)
PROTHROMBIN TIME: 14.2 SECONDS (ref 9.8–12.4)
RBC # BLD AUTO: 4.84 X10*6/UL (ref 4.5–5.9)
RH FACTOR (ANTIGEN D): NORMAL
RH FACTOR (ANTIGEN D): NORMAL
SODIUM SERPL-SCNC: 136 MMOL/L (ref 136–145)
WBC # BLD AUTO: 4.6 X10*3/UL (ref 4.4–11.3)

## 2025-09-04 PROCEDURE — C1769 GUIDE WIRE: HCPCS | Performed by: INTERNAL MEDICINE

## 2025-09-04 PROCEDURE — 2500000004 HC RX 250 GENERAL PHARMACY W/ HCPCS (ALT 636 FOR OP/ED): Performed by: ANESTHESIOLOGIST ASSISTANT

## 2025-09-04 PROCEDURE — A93656 PR EPHYS EVL TRNSPTL TX ATRIAL FIB ISOLAT PULM VEIN: Performed by: ANESTHESIOLOGIST ASSISTANT

## 2025-09-04 PROCEDURE — A93656 PR EPHYS EVL TRNSPTL TX ATRIAL FIB ISOLAT PULM VEIN: Performed by: ANESTHESIOLOGY

## 2025-09-04 PROCEDURE — C1730 CATH, EP, 19 OR FEW ELECT: HCPCS | Performed by: INTERNAL MEDICINE

## 2025-09-04 PROCEDURE — 36620 INSERTION CATHETER ARTERY: CPT | Performed by: ANESTHESIOLOGY

## 2025-09-04 PROCEDURE — 93656 COMPRE EP EVAL ABLTJ ATR FIB: CPT | Performed by: INTERNAL MEDICINE

## 2025-09-04 PROCEDURE — 36415 COLL VENOUS BLD VENIPUNCTURE: CPT | Performed by: NURSE PRACTITIONER

## 2025-09-04 PROCEDURE — 85730 THROMBOPLASTIN TIME PARTIAL: CPT | Performed by: NURSE PRACTITIONER

## 2025-09-04 PROCEDURE — 86901 BLOOD TYPING SEROLOGIC RH(D): CPT | Performed by: NURSE PRACTITIONER

## 2025-09-04 PROCEDURE — 2720000007 HC OR 272 NO HCPCS: Performed by: INTERNAL MEDICINE

## 2025-09-04 PROCEDURE — 2500000005 HC RX 250 GENERAL PHARMACY W/O HCPCS: Performed by: ANESTHESIOLOGIST ASSISTANT

## 2025-09-04 PROCEDURE — C1759 CATH, INTRA ECHOCARDIOGRAPHY: HCPCS | Performed by: INTERNAL MEDICINE

## 2025-09-04 PROCEDURE — 85027 COMPLETE CBC AUTOMATED: CPT | Performed by: NURSE PRACTITIONER

## 2025-09-04 PROCEDURE — C1760 CLOSURE DEV, VASC: HCPCS | Performed by: INTERNAL MEDICINE

## 2025-09-04 PROCEDURE — 2780000003 HC OR 278 NO HCPCS: Performed by: INTERNAL MEDICINE

## 2025-09-04 PROCEDURE — C1766 INTRO/SHEATH,STRBLE,NON-PEEL: HCPCS | Performed by: INTERNAL MEDICINE

## 2025-09-04 PROCEDURE — 85347 COAGULATION TIME ACTIVATED: CPT

## 2025-09-04 PROCEDURE — 80048 BASIC METABOLIC PNL TOTAL CA: CPT | Performed by: NURSE PRACTITIONER

## 2025-09-04 PROCEDURE — 7100000010 HC PHASE TWO TIME - EACH INCREMENTAL 1 MINUTE: Performed by: INTERNAL MEDICINE

## 2025-09-04 PROCEDURE — 3700000002 HC GENERAL ANESTHESIA TIME - EACH INCREMENTAL 1 MINUTE: Performed by: INTERNAL MEDICINE

## 2025-09-04 PROCEDURE — C1751 CATH, INF, PER/CENT/MIDLINE: HCPCS | Performed by: INTERNAL MEDICINE

## 2025-09-04 PROCEDURE — C1894 INTRO/SHEATH, NON-LASER: HCPCS | Performed by: INTERNAL MEDICINE

## 2025-09-04 PROCEDURE — 93005 ELECTROCARDIOGRAM TRACING: CPT

## 2025-09-04 PROCEDURE — C1733 CATH, EP, OTHR THAN COOL-TIP: HCPCS | Performed by: INTERNAL MEDICINE

## 2025-09-04 PROCEDURE — 7100000001 HC RECOVERY ROOM TIME - INITIAL BASE CHARGE: Performed by: INTERNAL MEDICINE

## 2025-09-04 PROCEDURE — 7100000002 HC RECOVERY ROOM TIME - EACH INCREMENTAL 1 MINUTE: Performed by: INTERNAL MEDICINE

## 2025-09-04 PROCEDURE — 7100000009 HC PHASE TWO TIME - INITIAL BASE CHARGE: Performed by: INTERNAL MEDICINE

## 2025-09-04 PROCEDURE — 2500000004 HC RX 250 GENERAL PHARMACY W/ HCPCS (ALT 636 FOR OP/ED): Performed by: INTERNAL MEDICINE

## 2025-09-04 PROCEDURE — C1893 INTRO/SHEATH, FIXED,NON-PEEL: HCPCS | Performed by: INTERNAL MEDICINE

## 2025-09-04 PROCEDURE — G0269 OCCLUSIVE DEVICE IN VEIN ART: HCPCS | Performed by: INTERNAL MEDICINE

## 2025-09-04 PROCEDURE — 3700000001 HC GENERAL ANESTHESIA TIME - INITIAL BASE CHARGE: Performed by: INTERNAL MEDICINE

## 2025-09-04 RX ORDER — HYDROMORPHONE HYDROCHLORIDE 1 MG/ML
1 INJECTION, SOLUTION INTRAMUSCULAR; INTRAVENOUS; SUBCUTANEOUS EVERY 5 MIN PRN
Status: DISCONTINUED | OUTPATIENT
Start: 2025-09-04 | End: 2025-09-04 | Stop reason: HOSPADM

## 2025-09-04 RX ORDER — HEPARIN SODIUM 10000 [USP'U]/100ML
INJECTION, SOLUTION INTRAVENOUS CONTINUOUS PRN
Status: COMPLETED | OUTPATIENT
Start: 2025-09-04 | End: 2025-09-04

## 2025-09-04 RX ORDER — LABETALOL HYDROCHLORIDE 5 MG/ML
5 INJECTION, SOLUTION INTRAVENOUS ONCE AS NEEDED
Status: DISCONTINUED | OUTPATIENT
Start: 2025-09-04 | End: 2025-09-04 | Stop reason: HOSPADM

## 2025-09-04 RX ORDER — ONDANSETRON HYDROCHLORIDE 2 MG/ML
INJECTION, SOLUTION INTRAVENOUS AS NEEDED
Status: DISCONTINUED | OUTPATIENT
Start: 2025-09-04 | End: 2025-09-04

## 2025-09-04 RX ORDER — ATROPINE SULFATE 1 MG/ML
INJECTION, SOLUTION INTRAVENOUS AS NEEDED
Status: DISCONTINUED | OUTPATIENT
Start: 2025-09-04 | End: 2025-09-04

## 2025-09-04 RX ORDER — ONDANSETRON HYDROCHLORIDE 2 MG/ML
4 INJECTION, SOLUTION INTRAVENOUS ONCE AS NEEDED
Status: DISCONTINUED | OUTPATIENT
Start: 2025-09-04 | End: 2025-09-04 | Stop reason: HOSPADM

## 2025-09-04 RX ORDER — METOPROLOL TARTRATE 25 MG/1
25 TABLET, FILM COATED ORAL DAILY
Start: 2025-09-04

## 2025-09-04 RX ORDER — PROPOFOL 10 MG/ML
INJECTION, EMULSION INTRAVENOUS AS NEEDED
Status: DISCONTINUED | OUTPATIENT
Start: 2025-09-04 | End: 2025-09-04

## 2025-09-04 RX ORDER — ACETAMINOPHEN 325 MG/1
650 TABLET ORAL EVERY 4 HOURS PRN
Status: DISCONTINUED | OUTPATIENT
Start: 2025-09-04 | End: 2025-09-04 | Stop reason: HOSPADM

## 2025-09-04 RX ORDER — SODIUM CHLORIDE, SODIUM LACTATE, POTASSIUM CHLORIDE, CALCIUM CHLORIDE 600; 310; 30; 20 MG/100ML; MG/100ML; MG/100ML; MG/100ML
100 INJECTION, SOLUTION INTRAVENOUS CONTINUOUS
Status: DISCONTINUED | OUTPATIENT
Start: 2025-09-04 | End: 2025-09-04 | Stop reason: HOSPADM

## 2025-09-04 RX ORDER — LIDOCAINE HYDROCHLORIDE 20 MG/ML
INJECTION, SOLUTION INFILTRATION; PERINEURAL AS NEEDED
Status: DISCONTINUED | OUTPATIENT
Start: 2025-09-04 | End: 2025-09-04 | Stop reason: HOSPADM

## 2025-09-04 RX ORDER — NORETHINDRONE AND ETHINYL ESTRADIOL 0.5-0.035
KIT ORAL AS NEEDED
Status: DISCONTINUED | OUTPATIENT
Start: 2025-09-04 | End: 2025-09-04

## 2025-09-04 RX ORDER — HYDRALAZINE HYDROCHLORIDE 20 MG/ML
10 INJECTION INTRAMUSCULAR; INTRAVENOUS EVERY 30 MIN PRN
Status: DISCONTINUED | OUTPATIENT
Start: 2025-09-04 | End: 2025-09-04 | Stop reason: HOSPADM

## 2025-09-04 RX ORDER — DIPHENHYDRAMINE HYDROCHLORIDE 50 MG/ML
12.5 INJECTION, SOLUTION INTRAMUSCULAR; INTRAVENOUS ONCE AS NEEDED
Status: DISCONTINUED | OUTPATIENT
Start: 2025-09-04 | End: 2025-09-04 | Stop reason: HOSPADM

## 2025-09-04 RX ORDER — FENTANYL CITRATE 50 UG/ML
INJECTION, SOLUTION INTRAMUSCULAR; INTRAVENOUS AS NEEDED
Status: DISCONTINUED | OUTPATIENT
Start: 2025-09-04 | End: 2025-09-04

## 2025-09-04 RX ORDER — LIDOCAINE HCL/PF 100 MG/5ML
SYRINGE (ML) INTRAVENOUS AS NEEDED
Status: DISCONTINUED | OUTPATIENT
Start: 2025-09-04 | End: 2025-09-04

## 2025-09-04 RX ORDER — PHENYLEPHRINE 10 MG/250 ML(40 MCG/ML)IN 0.9 % SOD.CHLORIDE INTRAVENOUS
CONTINUOUS PRN
Status: DISCONTINUED | OUTPATIENT
Start: 2025-09-04 | End: 2025-09-04

## 2025-09-04 RX ORDER — MIDAZOLAM HYDROCHLORIDE 1 MG/ML
INJECTION, SOLUTION INTRAMUSCULAR; INTRAVENOUS AS NEEDED
Status: DISCONTINUED | OUTPATIENT
Start: 2025-09-04 | End: 2025-09-04

## 2025-09-04 RX ORDER — PHENYLEPHRINE HCL IN 0.9% NACL 1 MG/10 ML
SYRINGE (ML) INTRAVENOUS AS NEEDED
Status: DISCONTINUED | OUTPATIENT
Start: 2025-09-04 | End: 2025-09-04

## 2025-09-04 RX ORDER — HEPARIN SODIUM 1000 [USP'U]/ML
INJECTION, SOLUTION INTRAVENOUS; SUBCUTANEOUS AS NEEDED
Status: DISCONTINUED | OUTPATIENT
Start: 2025-09-04 | End: 2025-09-04 | Stop reason: HOSPADM

## 2025-09-04 RX ORDER — PROTAMINE SULFATE 10 MG/ML
INJECTION, SOLUTION INTRAVENOUS AS NEEDED
Status: DISCONTINUED | OUTPATIENT
Start: 2025-09-04 | End: 2025-09-04

## 2025-09-04 RX ORDER — ACETAMINOPHEN 325 MG/1
650 TABLET ORAL EVERY 4 HOURS PRN
Status: CANCELLED | OUTPATIENT
Start: 2025-09-04

## 2025-09-04 RX ADMIN — FENTANYL CITRATE 25 MCG: 50 INJECTION, SOLUTION INTRAMUSCULAR; INTRAVENOUS at 14:15

## 2025-09-04 RX ADMIN — PROPOFOL 200 MG: 10 INJECTION, EMULSION INTRAVENOUS at 12:16

## 2025-09-04 RX ADMIN — PROTAMINE SULFATE 50 MG: 10 INJECTION, SOLUTION INTRAVENOUS at 14:40

## 2025-09-04 RX ADMIN — ONDANSETRON 4 MG: 2 INJECTION, SOLUTION INTRAMUSCULAR; INTRAVENOUS at 14:40

## 2025-09-04 RX ADMIN — FENTANYL CITRATE 25 MCG: 50 INJECTION, SOLUTION INTRAMUSCULAR; INTRAVENOUS at 14:43

## 2025-09-04 RX ADMIN — Medication 100 MCG: at 12:25

## 2025-09-04 RX ADMIN — SODIUM CHLORIDE, SODIUM LACTATE, POTASSIUM CHLORIDE, AND CALCIUM CHLORIDE: .6; .31; .03; .02 INJECTION, SOLUTION INTRAVENOUS at 12:09

## 2025-09-04 RX ADMIN — FENTANYL CITRATE 50 MCG: 50 INJECTION, SOLUTION INTRAMUSCULAR; INTRAVENOUS at 12:16

## 2025-09-04 RX ADMIN — Medication 100 MCG: at 13:42

## 2025-09-04 RX ADMIN — MIDAZOLAM 2 MG: 1 INJECTION INTRAMUSCULAR; INTRAVENOUS at 12:09

## 2025-09-04 RX ADMIN — EPHEDRINE SULFATE 15 MG: 50 INJECTION, SOLUTION INTRAVENOUS at 12:47

## 2025-09-04 RX ADMIN — EPHEDRINE SULFATE 20 MG: 50 INJECTION, SOLUTION INTRAVENOUS at 12:59

## 2025-09-04 RX ADMIN — Medication 100 MCG: at 12:32

## 2025-09-04 RX ADMIN — EPHEDRINE SULFATE 15 MG: 50 INJECTION, SOLUTION INTRAVENOUS at 12:38

## 2025-09-04 RX ADMIN — DEXAMETHASONE SODIUM PHOSPHATE 8 MG: 4 INJECTION, SOLUTION INTRAMUSCULAR; INTRAVENOUS at 13:44

## 2025-09-04 RX ADMIN — LIDOCAINE HYDROCHLORIDE 50 MG: 20 INJECTION, SOLUTION INTRAVENOUS at 12:16

## 2025-09-04 RX ADMIN — ATROPINE SULFATE 1 MG: 1 INJECTION, SOLUTION INTRAMUSCULAR; INTRAVENOUS; SUBCUTANEOUS at 14:10

## 2025-09-04 RX ADMIN — Medication 0.2 MCG/KG/MIN: at 13:43

## 2025-09-04 SDOH — HEALTH STABILITY: MENTAL HEALTH: CURRENT SMOKER: 0

## 2025-09-04 ASSESSMENT — PAIN SCALES - GENERAL

## 2025-09-04 ASSESSMENT — PAIN - FUNCTIONAL ASSESSMENT
PAIN_FUNCTIONAL_ASSESSMENT: 0-10
PAIN_FUNCTIONAL_ASSESSMENT: 0-10

## 2025-09-05 LAB
ATRIAL RATE: 44 BPM
P AXIS: 82 DEGREES
P OFFSET: 194 MS
P ONSET: 124 MS
PR INTERVAL: 192 MS
Q ONSET: 220 MS
QRS COUNT: 7 BEATS
QRS DURATION: 112 MS
QT INTERVAL: 500 MS
QTC CALCULATION(BAZETT): 427 MS
QTC FREDERICIA: 450 MS
R AXIS: -14 DEGREES
T AXIS: 45 DEGREES
T OFFSET: 470 MS
VENTRICULAR RATE: 44 BPM

## 2025-10-10 ENCOUNTER — APPOINTMENT (OUTPATIENT)
Dept: PRIMARY CARE | Facility: CLINIC | Age: 65
End: 2025-10-10
Payer: COMMERCIAL

## 2025-10-31 ENCOUNTER — APPOINTMENT (OUTPATIENT)
Dept: CARDIOLOGY | Facility: CLINIC | Age: 65
End: 2025-10-31
Payer: COMMERCIAL

## 2026-07-31 ENCOUNTER — APPOINTMENT (OUTPATIENT)
Dept: PRIMARY CARE | Facility: CLINIC | Age: 66
End: 2026-07-31
Payer: COMMERCIAL

## (undated) DEVICE — ACCESS KIT, MINI MAK, 4FR X 10CML, 0.018 X 40CM, SS/SS, ECHO ENHANCED 7CM NDL

## (undated) DEVICE — GUIDEWIRE, INQWIRE, 3MM J, .035 X 210CM, FIXED

## (undated) DEVICE — GUIDEWIRE, INQWIRE, .035 X 180CM, 1.5 J-TIP

## (undated) DEVICE — CATHETER, VIEW FLEX XTRA ICE, 9FR

## (undated) DEVICE — SUTURE, VICRYL, 0, 27 IN, UR-6, VIOLET

## (undated) DEVICE — VERSACROSS KIT, ACCESS SOLUTION, RF WIRE 180CM

## (undated) DEVICE — CLOSURE SYSTEM, VASCADE MVP XL, 10-12FR

## (undated) DEVICE — GUIDEWIRE, WHOLEY, 0.035 IN X 145 CM, STRAIGHT/SHAPEABLE TIP

## (undated) DEVICE — TUBE SET, PNEUMOLAR HEATED, SMOKE EVACU, HIGH-FLOW

## (undated) DEVICE — GLOVE, SURGICAL, PROTEXIS PI ORTHO, 7.0, PF, LF

## (undated) DEVICE — SEAL, UNIVERSAL 5-8MM  XI

## (undated) DEVICE — CATHETER, ELECTRODE, STEERABLE, EP-XT, LG CURVE, 6FX125CM, REPROCESSED

## (undated) DEVICE — DRAPE, ARM XI

## (undated) DEVICE — ELECTRODE, MULTIFUNCTION, QUICK-COMBO, EDGE SYSTEM, 2 FT

## (undated) DEVICE — PAD, GROUNDING, ELECTROSURGICAL, W/9 FT CABLE, POLYHESIVE II, ADULT, LF

## (undated) DEVICE — CATHETER, ABLATION, PULSED FIELD, FARAWAVE 31 MM

## (undated) DEVICE — DRAPE, COLUMN, DAVINCI XI

## (undated) DEVICE — CARE KIT, LAPAROSCOPIC, ADVANCED

## (undated) DEVICE — TROCAR SYSTEM, BALLOON, KII GELPORT, 12 X 130MM

## (undated) DEVICE — TUBING, CONTRAST INJECTION, 500PSI, 10IN, 25CM

## (undated) DEVICE — INTRODUCER, CATHETER, HEMOSTASIS, FAST-CATH, 12 FR X 12 CM

## (undated) DEVICE — SOLUTION, IRRIGATION, USP, STERILE WATER, 500ML, BOTTLE

## (undated) DEVICE — SHEATH, STEERABLE, FARADRIVE, CLEAR

## (undated) DEVICE — INTRODUCER, SHEATH, ENGAGE, 9FR 25CM 0.035

## (undated) DEVICE — SHEATH, PINNACLE, W/.038 GUIDEWIRE, 10 CM,  6FR INTRODUCER, 6FR DIA, 2.5 CM DIALATOR

## (undated) DEVICE — CLOSURE SYSTEM, VASCULAR, MVP 6-12FR, VENOUS

## (undated) DEVICE — CATHETER KIT, RADIAL ARTLINE, 20G X 1 3/4

## (undated) DEVICE — Device

## (undated) DEVICE — FILTRATION DEVICE, PLUME PORT SEO LAPAROSCOPIC

## (undated) DEVICE — SUTURE, VICRYL, 3-0, 27 IN, SH, VIOLET

## (undated) DEVICE — CATHETER, ADVISOR HD GRID X MAPPING, BI-DIRECTIONAL, SENSOR ENABLED

## (undated) DEVICE — ELECTRODE KIT, ENSITE X EP SYSTEM SURFACE

## (undated) DEVICE — SUTURE, MONOCRYL, 4-0, 18 IN, PS2, UNDYED